# Patient Record
Sex: FEMALE | Employment: UNEMPLOYED | URBAN - METROPOLITAN AREA
[De-identification: names, ages, dates, MRNs, and addresses within clinical notes are randomized per-mention and may not be internally consistent; named-entity substitution may affect disease eponyms.]

---

## 2020-12-05 ENCOUNTER — HOSPITAL ENCOUNTER (EMERGENCY)
Age: 25
Discharge: HOME | End: 2020-12-05
Attending: EMERGENCY MEDICINE
Payer: MEDICARE

## 2020-12-05 VITALS
BODY MASS INDEX: 37.76 KG/M2 | RESPIRATION RATE: 18 BRPM | TEMPERATURE: 98.8 F | SYSTOLIC BLOOD PRESSURE: 144 MMHG | OXYGEN SATURATION: 97 % | HEART RATE: 110 BPM | WEIGHT: 220 LBS | DIASTOLIC BLOOD PRESSURE: 91 MMHG

## 2020-12-05 PROCEDURE — 6370000000 HC RX 637 (ALT 250 FOR IP): Performed by: STUDENT IN AN ORGANIZED HEALTH CARE EDUCATION/TRAINING PROGRAM

## 2020-12-05 PROCEDURE — 99283 EMERGENCY DEPT VISIT LOW MDM: CPT

## 2020-12-05 RX ORDER — DICYCLOMINE HYDROCHLORIDE 10 MG/1
20 CAPSULE ORAL ONCE
Status: COMPLETED | OUTPATIENT
Start: 2020-12-05 | End: 2020-12-05

## 2020-12-05 RX ORDER — SERTRALINE HYDROCHLORIDE 100 MG/1
150 TABLET, FILM COATED ORAL DAILY
Status: ON HOLD | COMMUNITY
End: 2022-09-04

## 2020-12-05 RX ORDER — DICYCLOMINE HYDROCHLORIDE 10 MG/ML
20 INJECTION INTRAMUSCULAR ONCE
Status: DISCONTINUED | OUTPATIENT
Start: 2020-12-05 | End: 2020-12-05

## 2020-12-05 RX ORDER — ONDANSETRON 4 MG/1
4 TABLET, FILM COATED ORAL ONCE
Status: COMPLETED | OUTPATIENT
Start: 2020-12-05 | End: 2020-12-05

## 2020-12-05 RX ORDER — BUPRENORPHINE AND NALOXONE 8; 2 MG/1; MG/1
3 FILM, SOLUBLE BUCCAL; SUBLINGUAL DAILY
Status: ON HOLD | COMMUNITY
End: 2022-09-04

## 2020-12-05 RX ORDER — PROMETHAZINE HYDROCHLORIDE 25 MG/ML
6.25 INJECTION, SOLUTION INTRAMUSCULAR; INTRAVENOUS ONCE
Status: DISCONTINUED | OUTPATIENT
Start: 2020-12-05 | End: 2020-12-05

## 2020-12-05 RX ADMIN — DICYCLOMINE HYDROCHLORIDE 20 MG: 10 CAPSULE ORAL at 10:48

## 2020-12-05 RX ADMIN — ONDANSETRON HYDROCHLORIDE 4 MG: 4 TABLET, FILM COATED ORAL at 10:48

## 2020-12-05 ASSESSMENT — ENCOUNTER SYMPTOMS
ABDOMINAL PAIN: 1
VOMITING: 0
SHORTNESS OF BREATH: 0
DIARRHEA: 0
CHEST TIGHTNESS: 1
EYE DISCHARGE: 1
SORE THROAT: 0
RHINORRHEA: 1
COLOR CHANGE: 0
NAUSEA: 1

## 2020-12-05 NOTE — ED PROVIDER NOTES
[facility] [address] [facility]     Emergency Department     Faculty Attestation        I performed a history and physical examination of the patient and discussed management with the resident. I reviewed the residents note and agree with the documented findings and plan of care. Any areas of disagreement are noted on the chart. I was personally present for the key portions of any procedures. I have documented in the chart those procedures where I was not present during the key portions. I have reviewed the emergency nurses triage note. I agree with the chief complaint, past medical history, past surgical history, allergies, medications, social and family history as documented unless otherwise noted below. For mid-level providers such as nurse practitioners as well as physicians assistants:    I have personally seen and evaluated the patient. I find the patient's history and physical exam are consistent with NP/PA documentation. I agree with the care provided, treatment rendered, disposition, & follow-up plan. Additional findings are as noted. Vital Signs: BP (!) 144/91   Pulse 110   Temp 98.8 °F (37.1 °C)   Resp 18   Wt 220 lb (99.8 kg)   SpO2 97%   BMI 37.76 kg/m²   PCP:  Precious Cisneros MD    Pertinent Comments:     Patient complains of withdrawal symptoms from Suboxone she was brought to get a Suboxone injection on 2 December but due to insurance policies this was pushed back to week. Planes of body aches and general malaise. Exam she is afebrile nontoxic she is texting in no acute distress during my entire examination. Although her triage vitals list her pulse is 110 when I taken myself is consistently in the to mid 80s.       Critical Care  None          Jefferson Rubin MD  Attending Emergency Medicine Physician              Pura Coyne MD  12/05/20 6986

## 2020-12-05 NOTE — ED PROVIDER NOTES
[facility] ED  Emergency Department Encounter  EmergencyMedicine Resident     Pt Pauline Vargas  MRN: [id number]  [place] 1995  Date of evaluation: 12/5/20  PCP:  No primary care provider on file. CHIEF COMPLAINT       Chief Complaint   Patient presents with    Other     Pt to to ED room 10 with c/o suboxone withdrawls. Pt states her last dose was 6 days ago. Pt states she has been taking half dose. Pt states she is out of her meds. HISTORY OF PRESENT ILLNESS  (Location/Symptom, Timing/Onset, Context/Setting, Quality, Duration, Modifying Factors, Severity.)      Kerensree Small is a 31 y.o. female who presents with concerns of Suboxone withdrawal.  She describes tremors, nausea, abdominal pain, chest pain shortness of breath, watery eyes, and aches all over. Patient last took a full dose of Suboxone 6 days ago and took half doses until 3 days ago. These symptoms have been progressing for the last few days. She came to the ED today hoping to get a dose of Suboxone until she can follow-up on Wednesday when she is scheduled. Patient denies recent illness, headache, vision changes, new weakness or numbness, or other systemic complaints. PAST MEDICAL / SURGICAL / SOCIAL / FAMILY HISTORY      has a past medical history of Endometriosis, GERD (gastroesophageal reflux disease), and History of miscarriage. has no past surgical history on file.       Social History     Socioeconomic History    Marital status: Single     Spouse name: Not on file    Number of children: Not on file    Years of education: 15    Highest education level: Not on file   Occupational History    Occupation: unemployed   Social Needs    Financial resource strain: Not on file    Food insecurity     Worry: Not on file     Inability: Not on file   [employer] needs     Medical: Not on file     Non-medical: Not on file   Tobacco Use    Smoking status: Current Every Day Smoker     Types: Cigarettes   Substance and Sexual Activity    Alcohol use: Yes     Alcohol/week: 2.5 standard drinks     Types: 3 drink(s) per week    Drug use: Yes     Types: Other-see comments     Comment: 10norco/day, fentanyl patches, marijuana, ecstacy    Sexual activity: Yes     Partners: Male   Lifestyle    Physical activity     Days per week: Not on file     Minutes per session: Not on file    Stress: Not on file   Relationships    Social connections     Talks on phone: Not on file     Gets together: Not on file     Attends Mandaeism service: Not on file     Active member of club or organization: Not on file     Attends meetings of clubs or organizations: Not on file     Relationship status: Not on file    Intimate partner violence     Fear of current or ex partner: Not on file     Emotionally abused: Not on file     Physically abused: Not on file     Forced sexual activity: Not on file   Other Topics Concern    Not on file   Social History Narrative    Not on file       No family history on file. Allergies:  Bee venom; Depakote er [divalproex sodium er]; Geodon [ziprasidone hcl]; Versed [midazolam]; and Morphine and related    Home Medications:  Prior to Admission medications    Medication Sig Start Date End Date Taking? Authorizing Provider   Buprenorphine HCl-Naloxone HCl (SUBOXONE SL) Place under the tongue   Yes Historical Provider, MD   sertraline (ZOLOFT) 100 MG tablet Take 150 mg by mouth daily   Yes Historical Provider, MD       REVIEW OF SYSTEMS    (2-9 systems for level 4, 10 or more for level 5)      Review of Systems   Constitutional: Negative for chills, diaphoresis and fever. HENT: Positive for rhinorrhea. Negative for sore throat. Eyes: Positive for discharge (watery). Negative for visual disturbance. Respiratory: Positive for chest tightness. Negative for shortness of breath. Cardiovascular: Negative for chest pain. Gastrointestinal: Positive for abdominal pain and nausea.  Negative for diarrhea and vomiting. Genitourinary: Negative for dysuria. Musculoskeletal: Positive for myalgias. Negative for arthralgias. Skin: Negative for color change and rash. Neurological: Positive for tremors and headaches. Negative for dizziness, light-headedness and numbness. PHYSICAL EXAM   (up to 7 for level 4, 8 or more for level 5)      INITIAL VITALS:   BP (!) 144/91   Pulse 110   Temp 98.8 °F (37.1 °C)   Resp 18   Wt 220 lb (99.8 kg)   SpO2 97%   BMI 37.76 kg/m²     Physical Exam  Constitutional:       General: She is not in acute distress. Appearance: Normal appearance. She is not ill-appearing, toxic-appearing or diaphoretic. HENT:      Head: Normocephalic and atraumatic. Nose: Nose normal.      Mouth/Throat:      Mouth: Mucous membranes are moist.      Pharynx: Oropharynx is clear. Eyes:      Extraocular Movements: Extraocular movements intact. Pupils: Pupils are equal, round, and reactive to light. Neck:      Musculoskeletal: Normal range of motion and neck supple. Cardiovascular:      Rate and Rhythm: Regular rhythm. Tachycardia present. Pulses: Normal pulses. Heart sounds: Normal heart sounds. No murmur. Pulmonary:      Effort: Pulmonary effort is normal. No respiratory distress. Breath sounds: No wheezing or rhonchi. Abdominal:      General: There is no distension. Palpations: Abdomen is soft. Tenderness: There is abdominal tenderness (generalized). Musculoskeletal: Normal range of motion. General: No swelling or signs of injury. Skin:     General: Skin is warm and dry. Neurological:      General: No focal deficit present. Mental Status: She is alert and oriented to person, place, and time. Motor: No weakness. Comments: Mild bilateral hand tremors when outstretched.    Psychiatric:         Mood and Affect: Mood normal.         Behavior: Behavior normal.         DIFFERENTIAL  DIAGNOSIS     PLAN (LABS / IMAGING / EKG):  No orders of the defined types were placed in this encounter. MEDICATIONS ORDERED:  Orders Placed This Encounter   Medications    DISCONTD: promethazine (PHENERGAN) injection 6.25 mg    DISCONTD: dicyclomine (BENTYL) injection 20 mg    ondansetron (ZOFRAN) tablet 4 mg    dicyclomine (BENTYL) capsule 20 mg       DIAGNOSTIC RESULTS / EMERGENCY DEPARTMENT COURSE / [facility]   LAB RESULTS:  No results found for this visit on 12/05/20. IMPRESSION: 24-year-old female presenting with concerns of Suboxone withdrawal, symptoms of nausea, tremors, watery eyes. Physical exam, patient is tachycardic at 115 otherwise vitals are stable. Heart is regular  rhythm and lungs are clear to auscultation. Patient is sitting calmly with mild tremors when hands outstretched. There is mild generalized tenderness to palpation of the abdomen, no rebound or guarding. This facility does not prescribe Suboxone and patient was informed of this. We will treat symptoms with Phenergan and Bentyl at this time. RADIOLOGY:  Not indicated    EKG  n/a    All EKG's are interpreted by the Emergency Department Physician who either signs or Co-signs this chart in the absence of a cardiologist.    EMERGENCY DEPARTMENT COURSE:  ED Course as of Dec 06 1503   Sat Dec 05, 2020   1039 Patient evaluated for concerns of Suboxone withdrawals. Experiencing nausea, muscle aches, abdominal pain, watery eyes. Last full dose was 6 days ago, last Dose 3 days ago. Will treat symptoms and plan to discharge with antiemetics for symptom management. [JS]   1113 Reevaluated the patient. She is still uncomfortable and describes abdominal pain. We discussed not being able to prescribe Suboxone here, patient would like to be discharged so she can go to another hospital that can.     [JS]      ED Course User Index  [JS] Alanis Sarah 1841, DO         PROCEDURES:  n/a    CONSULTS:  None    CRITICAL CARE:  Please see attending note    FINAL IMPRESSION      1. Generalized abdominal pain    2.  Nausea          DISPOSITION / PLAN     DISPOSITION Decision To Discharge 12/05/2020 11:11:54 AM      PATIENT REFERRED TO:  Shannon Kwan MD  [address] [facility] [address]  [phone]          [facility]  [address] [place] [address]  [phone]    If symptoms worsen      DISCHARGE MEDICATIONS:  Discharge Medication List as of 12/5/2020 11:42 AM          Leyda Medina DO  Emergency Medicine Resident    (Please note that portions of thisnote were completed with a voice recognition program.  Efforts were made to edit the dictations but occasionally words are mis-transcribed.)       Leyda Medina DO  Resident  12/06/20 0191

## 2022-02-28 ENCOUNTER — HOSPITAL ENCOUNTER (EMERGENCY)
Age: 27
Discharge: HOME | End: 2022-02-28
Attending: EMERGENCY MEDICINE
Payer: MEDICARE

## 2022-02-28 VITALS
DIASTOLIC BLOOD PRESSURE: 108 MMHG | TEMPERATURE: 98 F | SYSTOLIC BLOOD PRESSURE: 133 MMHG | OXYGEN SATURATION: 98 % | RESPIRATION RATE: 18 BRPM | HEART RATE: 99 BPM

## 2022-02-28 DIAGNOSIS — F11.93 OPIOID USE WITH WITHDRAWAL (HCC): Primary | ICD-10-CM

## 2022-02-28 LAB
ANION GAP SERPL CALCULATED.3IONS-SCNC: 14 MMOL/L (ref 9–17)
BUN BLDV-MCNC: 7 MG/DL (ref 6–20)
CALCIUM SERPL-MCNC: 9.5 MG/DL (ref 8.6–10.4)
CHLORIDE BLD-SCNC: 101 MMOL/L (ref 98–107)
CO2: 24 MMOL/L (ref 20–31)
CREAT SERPL-MCNC: 0.6 MG/DL (ref 0.5–0.9)
GFR AFRICAN AMERICAN: >60 ML/MIN
GFR NON-AFRICAN AMERICAN: >60 ML/MIN
GFR SERPL CREATININE-BSD FRML MDRD: NORMAL ML/MIN/{1.73_M2}
GLUCOSE BLD-MCNC: 88 MG/DL (ref 70–99)
HCG QUALITATIVE: NEGATIVE
POTASSIUM SERPL-SCNC: 3.7 MMOL/L (ref 3.7–5.3)
SODIUM BLD-SCNC: 139 MMOL/L (ref 135–144)

## 2022-02-28 PROCEDURE — 80048 BASIC METABOLIC PNL TOTAL CA: CPT

## 2022-02-28 PROCEDURE — 96374 THER/PROPH/DIAG INJ IV PUSH: CPT

## 2022-02-28 PROCEDURE — 84703 CHORIONIC GONADOTROPIN ASSAY: CPT

## 2022-02-28 PROCEDURE — 99285 EMERGENCY DEPT VISIT HI MDM: CPT

## 2022-02-28 PROCEDURE — 93005 ELECTROCARDIOGRAM TRACING: CPT | Performed by: GENERAL PRACTICE

## 2022-02-28 PROCEDURE — 96361 HYDRATE IV INFUSION ADD-ON: CPT

## 2022-02-28 PROCEDURE — 2580000003 HC RX 258: Performed by: GENERAL PRACTICE

## 2022-02-28 PROCEDURE — 96375 TX/PRO/DX INJ NEW DRUG ADDON: CPT

## 2022-02-28 PROCEDURE — 6360000002 HC RX W HCPCS: Performed by: GENERAL PRACTICE

## 2022-02-28 PROCEDURE — 6370000000 HC RX 637 (ALT 250 FOR IP): Performed by: GENERAL PRACTICE

## 2022-02-28 RX ORDER — ONDANSETRON 4 MG/1
4 TABLET, ORALLY DISINTEGRATING ORAL EVERY 8 HOURS PRN
Qty: 4 TABLET | Refills: 0 | Status: ON HOLD | OUTPATIENT
Start: 2022-02-28 | End: 2022-09-04

## 2022-02-28 RX ORDER — CLONIDINE 0.1 MG/24H
1 PATCH, EXTENDED RELEASE TRANSDERMAL WEEKLY
Status: DISCONTINUED | OUTPATIENT
Start: 2022-02-28 | End: 2022-02-28 | Stop reason: HOSPADM

## 2022-02-28 RX ORDER — DIPHENHYDRAMINE HYDROCHLORIDE 50 MG/ML
25 INJECTION INTRAMUSCULAR; INTRAVENOUS EVERY 6 HOURS PRN
Status: DISCONTINUED | OUTPATIENT
Start: 2022-02-28 | End: 2022-02-28 | Stop reason: HOSPADM

## 2022-02-28 RX ORDER — ONDANSETRON 4 MG/1
4 TABLET, ORALLY DISINTEGRATING ORAL ONCE
Status: COMPLETED | OUTPATIENT
Start: 2022-02-28 | End: 2022-02-28

## 2022-02-28 RX ORDER — KETOROLAC TROMETHAMINE 30 MG/ML
30 INJECTION, SOLUTION INTRAMUSCULAR; INTRAVENOUS ONCE
Status: COMPLETED | OUTPATIENT
Start: 2022-02-28 | End: 2022-02-28

## 2022-02-28 RX ORDER — 0.9 % SODIUM CHLORIDE 0.9 %
500 INTRAVENOUS SOLUTION INTRAVENOUS ONCE
Status: COMPLETED | OUTPATIENT
Start: 2022-02-28 | End: 2022-02-28

## 2022-02-28 RX ORDER — ONDANSETRON 2 MG/ML
4 INJECTION INTRAMUSCULAR; INTRAVENOUS ONCE
Status: COMPLETED | OUTPATIENT
Start: 2022-02-28 | End: 2022-02-28

## 2022-02-28 RX ADMIN — KETOROLAC TROMETHAMINE 30 MG: 30 INJECTION, SOLUTION INTRAMUSCULAR; INTRAVENOUS at 20:33

## 2022-02-28 RX ADMIN — ONDANSETRON 4 MG: 4 TABLET, ORALLY DISINTEGRATING ORAL at 20:38

## 2022-02-28 RX ADMIN — ONDANSETRON 4 MG: 2 INJECTION INTRAMUSCULAR; INTRAVENOUS at 19:47

## 2022-02-28 RX ADMIN — DIPHENHYDRAMINE HYDROCHLORIDE 25 MG: 50 INJECTION, SOLUTION INTRAMUSCULAR; INTRAVENOUS at 20:33

## 2022-02-28 RX ADMIN — SODIUM CHLORIDE 500 ML: 9 INJECTION, SOLUTION INTRAVENOUS at 19:47

## 2022-02-28 ASSESSMENT — ENCOUNTER SYMPTOMS
SHORTNESS OF BREATH: 0
BACK PAIN: 0
COUGH: 0
ABDOMINAL PAIN: 1
NAUSEA: 1
VOMITING: 1
DIARRHEA: 1

## 2022-02-28 ASSESSMENT — PAIN DESCRIPTION - PAIN TYPE: TYPE: ACUTE PAIN

## 2022-02-28 ASSESSMENT — PAIN - FUNCTIONAL ASSESSMENT: PAIN_FUNCTIONAL_ASSESSMENT: 0-10

## 2022-02-28 ASSESSMENT — PAIN SCALES - GENERAL: PAINLEVEL_OUTOF10: 7

## 2022-02-28 ASSESSMENT — PAIN DESCRIPTION - LOCATION: LOCATION: CHEST;HEAD

## 2022-03-01 LAB
EKG ATRIAL RATE: 107 BPM
EKG P AXIS: 49 DEGREES
EKG P-R INTERVAL: 172 MS
EKG Q-T INTERVAL: 358 MS
EKG QRS DURATION: 94 MS
EKG QTC CALCULATION (BAZETT): 477 MS
EKG R AXIS: 59 DEGREES
EKG T AXIS: 14 DEGREES
EKG VENTRICULAR RATE: 107 BPM

## 2022-03-01 NOTE — ED NOTES
The following labs labeled with pt sticker and tubed to lab:     [] Blue     [x] Lavender   [] on ice  [x] Green/yellow  [] Green/black [] on ice  [] Yellow  [] Red  [] Pink      [] COVID-19 swab    [] Rapid  [] PCR  [] Flu swab  [] Peds Viral Panel     [] Urine Sample  [] Pelvic Cultures  [] Blood Cultures            Jose Daniel Lanza RN  02/28/22 1948

## 2022-03-01 NOTE — ED PROVIDER NOTES
[facility]     Emergency Department     Faculty Attestation    I performed a history and physical examination of the patient and discussed management with the resident. I reviewed the residents note and agree with the documented findings including all diagnostic interpretations and plan of care. Any areas of disagreement are noted on the chart. I was personally present for the key portions of any procedures. I have documented in the chart those procedures where I was not present during the key portions. I have reviewed the emergency nurses triage note. I agree with the chief complaint, past medical history, past surgical history, allergies, medications, social and family history as documented unless otherwise noted below. Documentation of the HPI, Physical Exam and Medical Decision Making performed by scribmara is based on my personal performance of the HPI, PE and MDM. For Physician Assistant/ Nurse Practitioner cases/documentation I have personally evaluated this patient and have completed at least one if not all key elements of the E/M (history, physical exam, and MDM). Additional findings are as noted. This patient was evaluated in the Emergency Department for symptoms described in the history of present illness. He/she was evaluated in the context of the global COVID-19 pandemic, which necessitated consideration that the patient might be at risk for infection with the SARS-CoV-2 virus that causes COVID-19. Institutional protocols and algorithms that pertain to the evaluation of patients at risk for COVID-19 are in a state of rapid change based on information released by regulatory bodies including the CDC and federal and state organizations. These policies and algorithms were followed during the patient's care in the ED. Primary Care Physician: No primary care provider on file. History:  This is a 31 y.o. female who presents to the Emergency Department with complaint of chest pain, concerns for withdrawal from opioids. Patient has been intermittently on Suboxone. Reports she took last dose on Wednesday of last week. Associated nausea and diarrhea as well as body aches. No fevers. Physical:     oral temperature is 98 °F (36.7 °C). Her blood pressure is 133/108 (abnormal) and her pulse is 99. Her respiration is 18 and oxygen saturation is 98%.    31 y.o. female no acute distress, cardiac exam tachycardic regular, pulmonary clear bilaterally abdomen soft nontender nondistended, erector PLI activity noted and pupils are slightly dilated    Impression: Opiate withdrawal    Plan: Patient reports that she has an appointment tomorrow with Mamie Castellanos for intake on medication assisted therapies however attempts to confirm this appointment have been unsuccessful. As such I am reluctant to provide Suboxone in the emergency department as I cannot ensure appropriate transition of care.   Will treat withdrawal symptoms symptomatically    EKG Interpretation  EKG Interpretation    Interpreted by emergency department physician    Rhythm: sinus tachycardia  Rate: 100-110  Axis: normal  Ectopy: none  Conduction: normal  ST Segments: normal  T Waves: normal  Q Waves: none    EKG  Impression: sinus tachycardia    Clementine Shin MD      Interpreted by me    Heather Morales MD, Claudean Rhein  Attending Emergency Physician         Clementine Shin MD  02/28/22 9638

## 2022-03-01 NOTE — ED PROVIDER NOTES
[facility]  ED  Emergency Department Encounter  EmergencyMedicine Resident     Pt Dary Swan  MRN: [id number]  [place] 1995  Date of evaluation: 2/28/22  PCP:  No primary care provider on file. CHIEF COMPLAINT       Chief Complaint   Patient presents with    Chest Pain     substernal chest pain intermittent x 4 days    Withdrawal     withdrawingfrom suboxone- [id number] Wed hasnt had a dose since       HISTORY OF PRESENT ILLNESS  (Location/Symptom, Timing/Onset, Context/Setting, Quality, Duration, Modifying Factors, Severity.)      Simeon Dolan is a 31 y.o. female who presents with concern for Suboxone withdrawal, last him she took it was 1 week ago. Complaint of chest headache, chest pain nausea as well as vomiting, nonbloody, has been on Suboxone for 3 years.  Appointment via telehealth. Tried to follow-up today has an appointment for tomorrow but felt that she could not wait. Patient states that the chest pain radiates to the left breast, denies any associated shortness of breath, diaphoresis. Patient states that she did have a relapse this past weekend and took several pills of codeine, denies any SI. Patient is having diarrhea, chills. PAST MEDICAL / SURGICAL / SOCIAL / FAMILY HISTORY      has a past medical history of Endometriosis, GERD (gastroesophageal reflux disease), and History of miscarriage. has no past surgical history on file. Social History     Socioeconomic History    Marital status: Single     Spouse name: Not on file    Number of children: Not on file    Years of education: 15    Highest education level: Not on file   Occupational History    Occupation: unemployed   Tobacco Use    Smoking status: Former Smoker     Types: Cigarettes    Smokeless tobacco: Never Used   Substance and Sexual Activity    Alcohol use: Not Currently    Drug use: Yes     Types:  Other-see comments     Comment: 10norco/day, fentanyl patches, marijuana, ecstacy    Sexual activity: Yes     Partners: Male   Other Topics Concern    Not on file   Social History Narrative    Not on file     Social Determinants of Health     Financial Resource Strain:     Difficulty of Paying Living Expenses: Not on file   Food Insecurity:     Worried About Running Out of Food in the Last Year: Not on file    Ev of Food in the Last Year: Not on file   Transportation Needs:     Lack of Transportation (Medical): Not on file    Lack of Transportation (Non-Medical): Not on file   Physical Activity:     Days of Exercise per Week: Not on file    Minutes of Exercise per Session: Not on file   Stress:     Feeling of Stress : Not on file   Social Connections:     Frequency of Communication with Friends and Family: Not on file    Frequency of Social Gatherings with Friends and Family: Not on file    Attends [employer] Services: Not on file    Active Member of [address] [place] [address] [employer] or Organizations: Not on file    Attends Club or Organization Meetings: Not on file    Marital Status: Not on file   Intimate Partner Violence:     Fear of Current or Ex-Partner: Not on file    Emotionally Abused: Not on file    Physically Abused: Not on file    Sexually Abused: Not on file   Housing Stability:     Unable to Pay for Housing in the Last Year: Not on file    Number of [place] in the Last Year: Not on file    Unstable Housing in the Last Year: Not on file       History reviewed. No pertinent family history. Allergies:  Bee venom, Depakote er [divalproex sodium er], Geodon [ziprasidone hcl], Versed [midazolam], and Morphine and related    Home Medications:  Prior to Admission medications    Medication Sig Start Date End Date Taking?  Authorizing Provider   ondansetron (ZOFRAN ODT) 4 MG disintegrating tablet Take 1 tablet by mouth every 8 hours as needed for Nausea 2/28/22  Yes Yared Farias, DO   Buprenorphine HCl-Naloxone HCl (SUBOXONE SL) Place under the tongue    Historical Provider, MD   sertraline (ZOLOFT) 100 MG tablet Take 150 mg by mouth daily    Historical Provider, MD       REVIEW OF SYSTEMS    (2-9 systems for level 4, 10 or more for level 5)      Review of Systems   Constitutional: Positive for activity change, appetite change and chills. Negative for fever. Eyes: Negative for visual disturbance. Respiratory: Negative for cough and shortness of breath. Cardiovascular: Positive for chest pain. Gastrointestinal: Positive for abdominal pain, diarrhea, nausea and vomiting. Genitourinary: Negative for dysuria and menstrual problem. Musculoskeletal: Negative for back pain and gait problem. Skin: Negative for rash. Neurological: Positive for headaches. Psychiatric/Behavioral: Negative for agitation. PHYSICAL EXAM   (up to 7 for level 4, 8 or more for level 5)      INITIAL VITALS:   BP (!) 133/108   Pulse 99   Temp 98 °F (36.7 °C) (Oral)   Resp 18   LMP 01/28/2022   SpO2 98%     Physical Exam  Vitals reviewed. Constitutional:       Appearance: She is obese. Comments: Patient is sitting comfortably on stretcher, is nonacute distress, is not ill-appearing is not diaphoretic   HENT:      Head: Normocephalic and atraumatic. Eyes:      Extraocular Movements: Extraocular movements intact. Pupils: Pupils are equal, round, and reactive to light. Cardiovascular:      Rate and Rhythm: Normal rate. Pulmonary:      Comments: Breathing comfortable room air, symmetric chest rise, speaking full sentences, no evidence respiratory distress  Abdominal:      General: Abdomen is flat. There is no distension. Palpations: Abdomen is soft. Tenderness: There is no abdominal tenderness. There is no guarding or rebound. Musculoskeletal:      Right lower leg: No edema. Left lower leg: No edema. Neurological:      General: No focal deficit present. Mental Status: She is alert and oriented to person, place, and time.    Psychiatric: Mood and Affect: Mood normal.         Behavior: Behavior normal.         Thought Content:  Thought content normal.         Judgment: Judgment normal.         DIFFERENTIAL  DIAGNOSIS     PLAN (LABS / [facility] / EKG):  Orders Placed This Encounter   Procedures    Basic Metabolic Panel    HCG Qualitative, Serum    EKG 12 Lead       MEDICATIONS ORDERED:  Orders Placed This Encounter   Medications    ondansetron (ZOFRAN) injection 4 mg    0.9 % sodium chloride bolus    cloNIDine (CATAPRES) 0.1 MG/24HR 1 patch    ondansetron (ZOFRAN ODT) 4 MG disintegrating tablet     Sig: Take 1 tablet by mouth every 8 hours as needed for Nausea     Dispense:  4 tablet     Refill:  0    ketorolac (TORADOL) injection 30 mg    diphenhydrAMINE (BENADRYL) injection 25 mg    ondansetron (ZOFRAN-ODT) disintegrating tablet 4 mg       DDX: Opioid withdrawal, low suspicion for ACS/pneumonia, electrolyte abnormality    DIAGNOSTIC RESULTS / EMERGENCY DEPARTMENT COURSE / MDM   :  Results for orders placed or performed during the hospital encounter of 32/30/66   Basic Metabolic Panel   Result Value Ref Range    Glucose 88 70 - 99 mg/dL    BUN 7 6 - 20 mg/dL    CREATININE 0.60 0.50 - 0.90 mg/dL    Calcium 9.5 8.6 - 10.4 mg/dL    Sodium 139 135 - 144 mmol/L    Potassium 3.7 3.7 - 5.3 mmol/L    Chloride 101 98 - 107 mmol/L    CO2 24 20 - 31 mmol/L    Anion Gap 14 9 - 17 mmol/L    GFR Non-African American >60 >60 mL/min    GFR African American >60 >60 mL/min    GFR Comment         HCG Qualitative, Serum   Result Value Ref Range    hCG Qual NEGATIVE NEGATIVE           RADIOLOGY:  None    EKG  EKG Interpretation    Interpreted by me    Rhythm: normal sinus   Rate: normal  Axis: normal  Ectopy: none  Conduction: normal  ST Segments: no acute change  T Waves: no acute change  Q Waves: none    Clinical Impression: no acute changes and normal EKG    All EKG's are interpreted by the Emergency Department Physician who either signs or Co-signs this chart in the absence of a cardiologist.    EMERGENCY DEPARTMENT COURSE/IMPRESSION: 21-year-old female history of opioid abuse presented emerge department concern for withdrawal, has not had Suboxone in 1 week. Requesting Suboxone. Patient states that she does have an appointment tomorrow for Suboxone. Discussed with our clinical pharmacist unable to confirm the patient does have an appointment, at this time not feel that it is appropriate to dose Suboxone here in the emergency department. Patient was given clonidine patch, Toradol, Zofran, Zofran, Benadryl for headache. Was given 500 cc bolus. Educated patient to follow-up with her Suboxone clinic, primary care provider, educated patient on strict ED return precautions. PROCEDURES:  None    CONSULTS:  None    CRITICAL CARE:  None    FINAL IMPRESSION      1.  Opioid use with withdrawal [facility])          DISPOSITION / PLAN     DISPOSITION Decision To Discharge 02/28/2022 08:29:22 PM      PATIENT REFERRED TO:  [address] [place] [address]  [phone]  In 1 day      [facility]  [address] [place] [address]  [phone]    As needed, If symptoms worsen      DISCHARGE MEDICATIONS:  New Prescriptions    ONDANSETRON (ZOFRAN ODT) 4 MG DISINTEGRATING TABLET    Take 1 tablet by mouth every 8 hours as needed for Nausea       Rimma Contreras DO  Emergency Medicine Resident    (Please note that portions of thisnote were completed with a voice recognition program.  Efforts were made to edit the dictations but occasionally words are mis-transcribed.)     Rimma Contreras DO  Resident  02/28/22 2037

## 2022-06-28 ENCOUNTER — HOSPITAL ENCOUNTER (EMERGENCY)
Age: 27
Discharge: LEFT BEFORE DISCHARGE | End: 2022-06-28
Payer: MEDICARE

## 2022-06-28 VITALS
RESPIRATION RATE: 16 BRPM | HEART RATE: 97 BPM | SYSTOLIC BLOOD PRESSURE: 140 MMHG | TEMPERATURE: 98.1 F | OXYGEN SATURATION: 97 % | DIASTOLIC BLOOD PRESSURE: 89 MMHG

## 2022-06-28 PROCEDURE — 93005 ELECTROCARDIOGRAM TRACING: CPT | Performed by: STUDENT IN AN ORGANIZED HEALTH CARE EDUCATION/TRAINING PROGRAM

## 2022-06-28 ASSESSMENT — PAIN SCALES - GENERAL: PAINLEVEL_OUTOF10: 6

## 2022-06-28 ASSESSMENT — PAIN - FUNCTIONAL ASSESSMENT: PAIN_FUNCTIONAL_ASSESSMENT: 0-10

## 2022-06-29 LAB
EKG ATRIAL RATE: 93 BPM
EKG P AXIS: 53 DEGREES
EKG P-R INTERVAL: 180 MS
EKG Q-T INTERVAL: 380 MS
EKG QRS DURATION: 96 MS
EKG QTC CALCULATION (BAZETT): 472 MS
EKG R AXIS: 55 DEGREES
EKG T AXIS: 26 DEGREES
EKG VENTRICULAR RATE: 93 BPM

## 2022-06-29 PROCEDURE — 93010 ELECTROCARDIOGRAM REPORT: CPT | Performed by: INTERNAL MEDICINE

## 2022-08-11 ENCOUNTER — TELEPHONE (OUTPATIENT)
Dept: BARIATRICS/WEIGHT MGMT | Age: 27
End: 2022-08-11

## 2022-09-03 RX ORDER — IBUPROFEN 400 MG/1
400 TABLET ORAL EVERY 6 HOURS PRN
Status: CANCELLED | OUTPATIENT
Start: 2022-09-03

## 2022-09-04 ENCOUNTER — HOSPITAL ENCOUNTER (INPATIENT)
Age: 27
LOS: 3 days | Discharge: HOME | DRG: 751 | End: 2022-09-07
Attending: PSYCHIATRY & NEUROLOGY | Admitting: PSYCHIATRY & NEUROLOGY
Payer: MEDICARE

## 2022-09-04 PROBLEM — R45.851 DEPRESSION WITH SUICIDAL IDEATION: Status: ACTIVE | Noted: 2022-09-04

## 2022-09-04 PROBLEM — F32.A DEPRESSION WITH SUICIDAL IDEATION: Status: ACTIVE | Noted: 2022-09-04

## 2022-09-04 PROBLEM — E66.01 MORBID OBESITY (HCC): Status: ACTIVE | Noted: 2022-09-04

## 2022-09-04 PROCEDURE — 1240000000 HC EMOTIONAL WELLNESS R&B

## 2022-09-04 PROCEDURE — APPSS60 APP SPLIT SHARED TIME 46-60 MINUTES: Performed by: NURSE PRACTITIONER

## 2022-09-04 PROCEDURE — 90792 PSYCH DIAG EVAL W/MED SRVCS: CPT | Performed by: PSYCHIATRY & NEUROLOGY

## 2022-09-04 PROCEDURE — 99232 SBSQ HOSP IP/OBS MODERATE 35: CPT | Performed by: INTERNAL MEDICINE

## 2022-09-04 PROCEDURE — G0480 DRUG TEST DEF 1-7 CLASSES: HCPCS

## 2022-09-04 PROCEDURE — 6370000000 HC RX 637 (ALT 250 FOR IP): Performed by: PSYCHIATRY & NEUROLOGY

## 2022-09-04 RX ORDER — HALOPERIDOL 5 MG
5 TABLET ORAL EVERY 6 HOURS PRN
Status: DISCONTINUED | OUTPATIENT
Start: 2022-09-04 | End: 2022-09-07 | Stop reason: HOSPADM

## 2022-09-04 RX ORDER — TRAZODONE HYDROCHLORIDE 50 MG/1
50 TABLET ORAL NIGHTLY PRN
Status: DISCONTINUED | OUTPATIENT
Start: 2022-09-04 | End: 2022-09-07 | Stop reason: HOSPADM

## 2022-09-04 RX ORDER — BUPRENORPHINE HYDROCHLORIDE AND NALOXONE HYDROCHLORIDE DIHYDRATE 8; 2 MG/1; MG/1
3 TABLET SUBLINGUAL DAILY
Status: DISCONTINUED | OUTPATIENT
Start: 2022-09-04 | End: 2022-09-07 | Stop reason: HOSPADM

## 2022-09-04 RX ORDER — SERTRALINE HYDROCHLORIDE 25 MG/1
25 TABLET, FILM COATED ORAL DAILY
Status: DISCONTINUED | OUTPATIENT
Start: 2022-09-04 | End: 2022-09-05

## 2022-09-04 RX ORDER — BACILLUS COAGULANS/INULIN 1B-250 MG
1 CAPSULE ORAL DAILY
Status: ON HOLD | COMMUNITY
End: 2022-09-07 | Stop reason: SDUPTHER

## 2022-09-04 RX ORDER — BUPRENORPHINE HYDROCHLORIDE AND NALOXONE HYDROCHLORIDE DIHYDRATE 8; 2 MG/1; MG/1
3 TABLET SUBLINGUAL DAILY
Status: ON HOLD | COMMUNITY
End: 2022-09-07 | Stop reason: HOSPADM

## 2022-09-04 RX ORDER — HYDROXYZINE 50 MG/1
50 TABLET, FILM COATED ORAL 3 TIMES DAILY PRN
Status: DISCONTINUED | OUTPATIENT
Start: 2022-09-04 | End: 2022-09-07 | Stop reason: HOSPADM

## 2022-09-04 RX ORDER — POLYETHYLENE GLYCOL 3350 17 G/17G
17 POWDER, FOR SOLUTION ORAL DAILY PRN
Status: DISCONTINUED | OUTPATIENT
Start: 2022-09-04 | End: 2022-09-07 | Stop reason: HOSPADM

## 2022-09-04 RX ORDER — LACTOBACILLUS RHAMNOSUS GG 10B CELL
1 CAPSULE ORAL DAILY
Status: DISCONTINUED | OUTPATIENT
Start: 2022-09-04 | End: 2022-09-07 | Stop reason: HOSPADM

## 2022-09-04 RX ORDER — ACETAMINOPHEN 325 MG/1
650 TABLET ORAL EVERY 6 HOURS PRN
Status: DISCONTINUED | OUTPATIENT
Start: 2022-09-04 | End: 2022-09-07 | Stop reason: HOSPADM

## 2022-09-04 RX ORDER — HALOPERIDOL 5 MG/ML
5 INJECTION INTRAMUSCULAR EVERY 6 HOURS PRN
Status: DISCONTINUED | OUTPATIENT
Start: 2022-09-04 | End: 2022-09-07 | Stop reason: HOSPADM

## 2022-09-04 RX ORDER — MAGNESIUM HYDROXIDE/ALUMINUM HYDROXICE/SIMETHICONE 120; 1200; 1200 MG/30ML; MG/30ML; MG/30ML
30 SUSPENSION ORAL EVERY 6 HOURS PRN
Status: DISCONTINUED | OUTPATIENT
Start: 2022-09-04 | End: 2022-09-07 | Stop reason: HOSPADM

## 2022-09-04 RX ORDER — DIPHENHYDRAMINE HYDROCHLORIDE 50 MG/ML
50 INJECTION INTRAMUSCULAR; INTRAVENOUS EVERY 6 HOURS PRN
Status: DISCONTINUED | OUTPATIENT
Start: 2022-09-04 | End: 2022-09-07 | Stop reason: HOSPADM

## 2022-09-04 RX ADMIN — TRAZODONE HYDROCHLORIDE 50 MG: 50 TABLET ORAL at 21:48

## 2022-09-04 RX ADMIN — Medication 1 CAPSULE: at 21:48

## 2022-09-04 RX ADMIN — HYDROXYZINE HYDROCHLORIDE 50 MG: 50 TABLET, FILM COATED ORAL at 21:48

## 2022-09-04 RX ADMIN — HYDROXYZINE HYDROCHLORIDE 50 MG: 50 TABLET, FILM COATED ORAL at 01:05

## 2022-09-04 RX ADMIN — TRAZODONE HYDROCHLORIDE 50 MG: 50 TABLET ORAL at 01:05

## 2022-09-04 RX ADMIN — SERTRALINE HYDROCHLORIDE 25 MG: 25 TABLET ORAL at 12:41

## 2022-09-04 ASSESSMENT — LIFESTYLE VARIABLES
HOW MANY STANDARD DRINKS CONTAINING ALCOHOL DO YOU HAVE ON A TYPICAL DAY: PATIENT DOES NOT DRINK
HOW OFTEN DO YOU HAVE A DRINK CONTAINING ALCOHOL: NEVER
HOW MANY STANDARD DRINKS CONTAINING ALCOHOL DO YOU HAVE ON A TYPICAL DAY: PATIENT DOES NOT DRINK
HOW OFTEN DO YOU HAVE A DRINK CONTAINING ALCOHOL: NEVER

## 2022-09-04 ASSESSMENT — SLEEP AND FATIGUE QUESTIONNAIRES
AVERAGE NUMBER OF SLEEP HOURS: 6
DO YOU USE A SLEEP AID: NO
DO YOU HAVE DIFFICULTY SLEEPING: NO

## 2022-09-04 ASSESSMENT — PATIENT HEALTH QUESTIONNAIRE - PHQ9: SUM OF ALL RESPONSES TO PHQ QUESTIONS 1-9: 16

## 2022-09-04 NOTE — BH NOTE
[facility]  Initial Interdisciplinary Treatment Plan NO      Original treatment plan Date & Time: 9/4/22 13:00    Admission Type:  Admission Type: Voluntary    Reason for admission:   Reason for Admission: Patient is having increase in depression with suicidal ideation with no specific plan    Estimated Length of Stay:  5-7days  Estimated Discharge Date: to be determined by physician    PATIENT STRENGTHS:  Patient Strengths:   Patient Strengths and Limitations:Limitations: Multiple barriers to leisure interests, Inappropriate/potentially harmful leisure interests, Difficulty problem solving/relies on others to help solve problems, External locus of control  Addictive Behavior: Addictive Behavior  In the Past 3 Months, Have You Felt or Has Someone Told You That You Have a Problem With  : None  Medical Problems:  Past Medical History:   Diagnosis Date    Endometriosis     GERD (gastroesophageal reflux disease)     History of miscarriage      Status EXAM:Mental Status and Behavioral Exam  Normal: No  Level of Assistance: Independent/Self  Facial Expression: Flat  Affect: Appropriate  Level of Consciousness: Alert  Frequency of Checks: 4 times per hour, close  Mood:Normal: No  Mood: Depressed, Anxious  Motor Activity:Normal: No  Motor Activity: Decreased  Eye Contact: Good  Observed Behavior: Cooperative  Sexual Misconduct History: Current - no  Preception: [place] to person, [place] to time, [place] to place, [place] to situation  Attention:Normal: No  Attention: Distractible  Thought Processes: Circumstantial  Thought Content:Normal: No  Thought Content: Preoccupations  Depression Symptoms: Feelings of hopelessess, Feelings of worthlessness  Anxiety Symptoms: Generalized  Sharlene Symptoms: No problems reported or observed.   Hallucinations: None  Delusions: No  Memory:Normal: No  Memory: Poor recent  Insight and Judgment: No  Insight and Judgment: Poor judgment, Poor insight    EDUCATION:   Learner Progress Toward Treatment Goals: reviewed group plans and strategies for care    Method:group therapy, medication compliance, individualized assessments and care planning    Outcome: needs reinforcement    PATIENT GOALS: to be discussed with patient within 72 hours    PLAN/TREATMENT RECOMMENDATIONS:     continue group therapy , medications compliance, goal setting, individualized assessments and care, continue to monitor pt on unit      SHORT-TERM GOALS:   Time frame for Short-Term Goals: 5-7 days  Find medications to take and continue to take. Find housing to remain stable for discharge.      LONG-TERM GOALS:  Time frame for Long-Term Goals: 6 months  Maintain taking medications and continue to go to follow up appointments   Members Present in Team Meeting: See Anneliese Galeana, RN

## 2022-09-04 NOTE — GROUP NOTE
Group Therapy Note    Date: 9/4/2022    Group Start Time: 1030  Group End Time: 1100  Group Topic: Psychoeducation    [facility]        Group Therapy Note    Attendees 9/22      Patient refused to attend psychotherapy group after encouragement from staff. 1:1 talk time offered but refused. Signature:   Mustapha Verde

## 2022-09-04 NOTE — H&P
[facility]  Jared Waldron MD; Orestes Mon MD; Heather Whipple MD; MD Tito Fountain MD; Debi Valentin MD    [facility]   [address] / HISTORY AND PHYSICAL EXAMINATION            Date:   9/4/2022  Patient name:  Jake Falk  Date of admission:  9/4/2022 12:28 AM  MRN:   [id number]  Account:  [de-identified]  YOB: 1995  PCP:    SAMI Vega CNP  Room:   Formerly named [facility]33-01  Code Status:    Full Code    Physician Requesting Consult: Josh Oliveros MD    Reason for Consult: Medical management    Chief Complaint:     No chief complaint on file. Suicidal ideations    History Obtained From:     patient    History of Present Illness:       25-year-old female with underlying history of anxiety and depression, GERD, morbid obesity, current smoker, admitted to inpatient psych with suicidal ideations    Past Medical History:     Past Medical History:   Diagnosis Date    Endometriosis     GERD (gastroesophageal reflux disease)     History of miscarriage         Past Surgical History:     No past surgical history on file. Medications Prior to Admission:     Prior to Admission medications    Medication Sig Start Date End Date Taking? Authorizing Provider   buprenorphine-naloxone (SUBOXONE) 8-2 MG SUBL SL tablet Place 3 tablets under the tongue daily. Yes Historical Provider, MD   Bacillus Coagulans-Inulin (PROBIOTIC) 1-250 BILLION-MG CAPS Take 1 capsule by mouth daily   Yes Historical Provider, MD        Allergies:     Bee venom, Depakote er [divalproex sodium er], Geodon [ziprasidone hcl], Prednisone, Toradol [ketorolac tromethamine], Versed [midazolam], and Morphine and related    Social History:     Tobacco:    reports that she has quit smoking. Her smoking use included cigarettes.  She has never used smokeless tobacco.  Alcohol:      reports that she does not currently use alcohol. Drug Use:  reports current drug use. Drug: Other-see comments. Family History:     No family history on file. Review of Systems:     Positive and Negative as described in HPI. CONSTITUTIONAL:  negative for fevers, chills, sweats, fatigue, weight loss  HEENT:  negative for vision, hearing changes, runny nose, throat pain  RESPIRATORY:  negative for shortness of breath, cough, congestion, wheezing. CARDIOVASCULAR:  negative for chest pain, palpitations. GASTROINTESTINAL:  negative for nausea, vomiting, diarrhea, constipation, change in bowel habits, abdominal pain   GENITOURINARY:  negative for difficulty of urination, burning with urination, frequency   INTEGUMENT:  negative for rash, skin lesions, easy bruising   HEMATOLOGIC/LYMPHATIC:  negative for swelling/edema   ALLERGIC/IMMUNOLOGIC:  negative for urticaria , itching  ENDOCRINE:  negative increase in drinking, increase in urination, hot or cold intolerance  MUSCULOSKELETAL:  negative joint pains, muscle aches, swelling of joints  NEUROLOGICAL:  negative for headaches, dizziness, lightheadedness, numbness, pain, tingling extremities    Physical Exam:     BP (!) 108/49   Pulse 69   Temp 97.8 °F (36.6 °C) (Oral)   Resp 14   Ht 5' 4\" (1.626 m)   Wt 220 lb (99.8 kg)   BMI 37.76 kg/m²   Temp (24hrs), Av.9 °F (36.6 °C), Min:97.8 °F (36.6 °C), Max:98 °F (36.7 °C)    No results for input(s): POCGLU in the last 72 hours. No intake or output data in the 24 hours ending 22 1618    General Appearance:  alert, well appearing, and in no acute distress  Mental status: oriented to person, place, and time with normal affect  Head:  normocephalic, atraumatic.   Eye: no icterus, redness, pupils equal and reactive, extraocular eye movements intact, conjunctiva clear  Ear: normal external ear, no discharge, hearing intact  Nose:  no drainage noted  Mouth: mucous membranes moist  Neck: supple, no carotid bruits, thyroid not palpable  Lungs: Bilateral equal air entry, clear to ausculation, no wheezing, rales or rhonchi, normal effort  Cardiovascular: normal rate, regular rhythm, no murmur, gallop, rub. Abdomen: Soft, nontender, nondistended, normal bowel sounds, no hepatomegaly or splenomegaly  Neurologic: There are no new focal motor or sensory deficits, normal muscle tone and bulk, no abnormal sensation, normal speech, cranial nerves II through XII grossly intact  Skin: No gross lesions, rashes, bruising or bleeding on exposed skin area  Extremities:  peripheral pulses palpable, no pedal edema or calf pain with palpation  Psych: normal affect    Investigations:      Laboratory Testing:  No results found for this or any previous visit (from the past 24 hour(s)). Imaging/Diagonstics:  No results found. Assessment :      Hospital Problems             Last Modified POA    * (Principal) Major depressive disorder, recurrent severe without psychotic features ([facility] [address] 9/4/2022 Yes    Morbid obesity ([facility] [address] 9/4/2022 Yes       Plan:     26-year-old female with underlying history of anxiety and depression admitted to inpatient psych for suicidal ideations,  opioid abuse, on Suboxone at this time,  Morbid obesity, low-calorie diet, lifestyle modification advised,  Current smoker advised to quit smoking,  DVT prophylaxis, patient mobile,  Full CODE STATUS,  Labs now, CBC CMP and TSH    Consultations:   IP CONSULT TO INTERNAL MEDICINE      Velta Bernheim, MD  9/4/2022  4:18 PM    Copy sent to Dr. Elsa Vanessa, APRN - CNP    Please note that this chart was generated using voice recognition Dragon dictation software. Although every effort was made to ensure the accuracy of this automated transcription, some errors in transcription may have occurred.

## 2022-09-04 NOTE — PLAN OF CARE
Problem: Self Harm/Suicidality  Goal: Will have no self-injury during hospital stay  Description: INTERVENTIONS:  1. Q 30 MINUTES: Routine safety checks  2. Q SHIFT & PRN: Assess risk to determine if routine checks are adequate to maintain patient safety  Outcome: Progressing     Problem: Depression  Goal: Will be euthymic at discharge  Description: INTERVENTIONS:  1. Administer medication as ordered  2. Provide emotional support via 1:1 interaction with staff  3. Encourage involvement in milieu/groups/activities  4. Monitor for social isolation  Outcome: Progressing   Pt currently denies any self harming thoughts denies any hallucinations isolative aloof out for needs mostly.
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
## 2022-09-04 NOTE — PROGRESS NOTES
Behavioral Services  Medicare Certification Upon Admission    I certify that this patient's inpatient psychiatric hospital admission is medically necessary for:    [x] (1) Treatment which could reasonably be expected to improve this patient's condition,       [x] (2) Or for diagnostic study;     AND     [x](2) The inpatient psychiatric services are provided while the individual is under the care of a physician and are included in the individualized plan of care.     Estimated length of stay/service 4 to 7 days    Plan for post-hospital care Home with outpatient community mental health follow-up    Electronically signed by Darlene Waters MD on 9/4/2022 at 3:27 PM
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
## 2022-09-04 NOTE — BH NOTE
[facility]  Admission Note     Admission Type:   Admission Type: Voluntary    Reason for admission:  Reason for Admission: Patient is having increase in depression with suicidal ideation with no specific plan      Addictive Behavior:   Addictive Behavior  In the Past 3 Months, Have You Felt or Has Someone Told You That You Have a Problem With  : None    Medical Problems:   Past Medical History:   Diagnosis Date    Endometriosis     GERD (gastroesophageal reflux disease)     History of miscarriage        Status EXAM:  Mental Status and Behavioral Exam  Normal: No  Level of Assistance: Independent/Self  Facial Expression: Flat  Affect: Blunt  Level of Consciousness: Alert  Frequency of Checks: 4 times per hour, close  Mood:Normal: No  Mood: Depressed, Anxious  Motor Activity:Normal: Yes  Eye Contact: Fair  Observed Behavior: Cooperative  Sexual Misconduct History: Current - no  Preception: [place] to person, [place] to time, Moss to place, [place] to situation  Attention:Normal: Yes  Thought Processes: Other (comment)  Thought Content:Normal: Yes  Depression Symptoms: Feelings of helplessness, Loss of interest, Feelings of worthlessness, Feelings of hopelessess  Anxiety Symptoms: Generalized, Panic attack  Sharlene Symptoms: No problems reported or observed.   Hallucinations: None  Delusions: No  Memory:Normal: Yes  Insight and Judgment: No  Insight and Judgment: Poor insight    Tobacco Screening:  Practical Counseling, on admission, simin X, if applicable and completed (first 3 are required if patient doesn't refuse):            ( ) Recognizing danger situations (included triggers and roadblocks)                    ( ) Coping skills (new ways to manage stress,relaxation techniques, changing routine, distraction)                                                           ( ) Basic information about quitting (benefits of quitting, techniques in how to quit, available resources  ( ) Referral for counseling faxed to [facility]                                                                                                                   ( ) Patient refused counseling  ( X) Patient has not smoked in the last 30 days    Metabolic Screening:    No results found for: LABA1C    No results found for: CHOL  No results found for: TRIG  No results found for: HDL  No components found for: LDLCAL  No results found for: LABVLDL      Body mass index is 37.76 kg/m². BP Readings from Last 2 Encounters:   09/04/22 (!) 128/95   02/28/22 (!) 133/108           Pt admitted with followings belongings:  Dental Appliances: None  Vision - Corrective Lenses: None  Hearing Aid: None  Jewelry: Body Piercing  Body Piercings Removed: No  Clothing:  Footwear, Jacket/Coat, Pants, Shirt, Socks, Undergarments  Other Valuables: Money, Wallet, Personal Toiletries ($14.80)    Hilda Hidalgo RN

## 2022-09-04 NOTE — BH NOTE
[facility]  Admission Note     Admission Type:   Admission Type: Voluntary    Reason for admission:  Reason for Admission: Patient is having increase in depression with suicidal ideation with no specific plan  Patient states she has hx of attempts years ago. Pt states she has daily panic attacks. She lives with her grandmother. She has a hx of polysubstance abuse and is currently getting suboxone from [facility]. Patient was wanded for safety and changed into hospital attire. Patient was cooperative with admission process and signed all admission paperwork. Addictive Behavior:   Addictive Behavior  In the Past 3 Months, Have You Felt or Has Someone Told You That You Have a Problem With  : None    Medical Problems:   Past Medical History:   Diagnosis Date    Endometriosis     GERD (gastroesophageal reflux disease)     History of miscarriage        Status EXAM:  Mental Status and Behavioral Exam  Normal: No  Level of Assistance: Independent/Self  Facial Expression: Flat  Affect: Blunt  Level of Consciousness: Alert  Frequency of Checks: 4 times per hour, close  Mood:Normal: No  Mood: Depressed, Anxious  Motor Activity:Normal: Yes  Eye Contact: Fair  Observed Behavior: Cooperative  Sexual Misconduct History: Current - no  Preception: [place] to person, [place] to time, [place] to place, [place] to situation  Attention:Normal: Yes  Thought Processes: Other (comment)  Thought Content:Normal: Yes  Depression Symptoms: Feelings of helplessness, Loss of interest, Feelings of worthlessness, Feelings of hopelessess  Anxiety Symptoms: Generalized, Panic attack  Sharlene Symptoms: No problems reported or observed.   Hallucinations: None  Delusions: No  Memory:Normal: Yes  Insight and Judgment: No  Insight and Judgment: Poor insight    Tobacco Screening:  Practical Counseling, on admission, simin X, if applicable and completed (first 3 are required if patient doesn't refuse):            ( ) Recognizing danger situations (included triggers and roadblocks)                    ( ) Coping skills (new ways to manage stress,relaxation techniques, changing routine, distraction)                                                           ( ) Basic information about quitting (benefits of quitting, techniques in how to quit, available resources  ( ) Referral for counseling faxed to [facility]                                                                                                                   ( ) Patient refused counseling  ( x) Patient has not smoked in the last 30 days    Metabolic Screening:    No results found for: LABA1C    No results found for: CHOL  No results found for: TRIG  No results found for: HDL  No components found for: LDLCAL  No results found for: LABVLDL      Body mass index is 37.76 kg/m². BP Readings from Last 2 Encounters:   09/04/22 (!) 128/95   02/28/22 (!) 133/108           Pt admitted with followings belongings:  Dental Appliances: None  Vision - Corrective Lenses: None  Hearing Aid: None  Jewelry: Body Piercing  Body Piercings Removed: No  Clothing:  Footwear, Jacket/Coat, Pants, Shirt, Socks, Undergarments  Other Valuables: Money, Wallet, Personal Toiletries ($14.80)    Jesika Etienne RN

## 2022-09-05 LAB
ABSOLUTE EOS #: 0.41 K/UL (ref 0–0.4)
ABSOLUTE LYMPH #: 2.37 K/UL (ref 1–4.8)
ABSOLUTE MONO #: 0.82 K/UL (ref 0.1–1.3)
ALBUMIN SERPL-MCNC: 4 G/DL (ref 3.5–5.2)
ALP BLD-CCNC: 63 U/L (ref 35–104)
ALT SERPL-CCNC: 19 U/L (ref 5–33)
ANION GAP SERPL CALCULATED.3IONS-SCNC: 10 MMOL/L (ref 9–17)
AST SERPL-CCNC: 28 U/L
BASOPHILS # BLD: 1 % (ref 0–2)
BASOPHILS ABSOLUTE: 0.1 K/UL (ref 0–0.2)
BILIRUB SERPL-MCNC: 0.3 MG/DL (ref 0.3–1.2)
BUN BLDV-MCNC: 11 MG/DL (ref 6–20)
CALCIUM SERPL-MCNC: 9.3 MG/DL (ref 8.6–10.4)
CHLORIDE BLD-SCNC: 100 MMOL/L (ref 98–107)
CO2: 27 MMOL/L (ref 20–31)
CREAT SERPL-MCNC: 0.68 MG/DL (ref 0.5–0.9)
EOSINOPHILS RELATIVE PERCENT: 4 % (ref 0–4)
GFR AFRICAN AMERICAN: >60 ML/MIN
GFR NON-AFRICAN AMERICAN: >60 ML/MIN
GFR SERPL CREATININE-BSD FRML MDRD: NORMAL ML/MIN/{1.73_M2}
GLUCOSE BLD-MCNC: 95 MG/DL (ref 70–99)
HCT VFR BLD CALC: 35.3 % (ref 36–46)
HEMOGLOBIN: 11.4 G/DL (ref 12–16)
LYMPHOCYTES # BLD: 23 % (ref 24–44)
MCH RBC QN AUTO: 24 PG (ref 26–34)
MCHC RBC AUTO-ENTMCNC: 32.4 G/DL (ref 31–37)
MCV RBC AUTO: 74.1 FL (ref 80–100)
MONOCYTES # BLD: 8 % (ref 1–7)
MORPHOLOGY: ABNORMAL
PDW BLD-RTO: 15.6 % (ref 11.5–14.9)
PLATELET # BLD: 275 K/UL (ref 150–450)
PMV BLD AUTO: 8.6 FL (ref 6–12)
POTASSIUM SERPL-SCNC: 4.3 MMOL/L (ref 3.7–5.3)
RBC # BLD: 4.76 M/UL (ref 4–5.2)
SEG NEUTROPHILS: 64 % (ref 36–66)
SEGMENTED NEUTROPHILS ABSOLUTE COUNT: 6.6 K/UL (ref 1.3–9.1)
SODIUM BLD-SCNC: 137 MMOL/L (ref 135–144)
TOTAL PROTEIN: 6.9 G/DL (ref 6.4–8.3)
TSH SERPL DL<=0.05 MIU/L-ACNC: 1.17 UIU/ML (ref 0.3–5)
WBC # BLD: 10.3 K/UL (ref 3.5–11)

## 2022-09-05 PROCEDURE — 1240000000 HC EMOTIONAL WELLNESS R&B

## 2022-09-05 PROCEDURE — 80053 COMPREHEN METABOLIC PANEL: CPT

## 2022-09-05 PROCEDURE — 36415 COLL VENOUS BLD VENIPUNCTURE: CPT

## 2022-09-05 PROCEDURE — APPSS30 APP SPLIT SHARED TIME 16-30 MINUTES: Performed by: NURSE PRACTITIONER

## 2022-09-05 PROCEDURE — 85025 COMPLETE CBC W/AUTO DIFF WBC: CPT

## 2022-09-05 PROCEDURE — 84443 ASSAY THYROID STIM HORMONE: CPT

## 2022-09-05 PROCEDURE — 6370000000 HC RX 637 (ALT 250 FOR IP): Performed by: PSYCHIATRY & NEUROLOGY

## 2022-09-05 PROCEDURE — 99232 SBSQ HOSP IP/OBS MODERATE 35: CPT | Performed by: PSYCHIATRY & NEUROLOGY

## 2022-09-05 PROCEDURE — 6370000000 HC RX 637 (ALT 250 FOR IP): Performed by: NURSE PRACTITIONER

## 2022-09-05 RX ORDER — BUSPIRONE HYDROCHLORIDE 10 MG/1
10 TABLET ORAL 3 TIMES DAILY
Status: DISCONTINUED | OUTPATIENT
Start: 2022-09-05 | End: 2022-09-07 | Stop reason: HOSPADM

## 2022-09-05 RX ORDER — ONDANSETRON 4 MG/1
4 TABLET, ORALLY DISINTEGRATING ORAL EVERY 8 HOURS PRN
Status: DISCONTINUED | OUTPATIENT
Start: 2022-09-05 | End: 2022-09-07 | Stop reason: HOSPADM

## 2022-09-05 RX ORDER — CYCLOBENZAPRINE HCL 10 MG
10 TABLET ORAL 3 TIMES DAILY PRN
Status: DISCONTINUED | OUTPATIENT
Start: 2022-09-05 | End: 2022-09-07 | Stop reason: HOSPADM

## 2022-09-05 RX ORDER — DICYCLOMINE HYDROCHLORIDE 10 MG/1
10 CAPSULE ORAL
Status: DISCONTINUED | OUTPATIENT
Start: 2022-09-05 | End: 2022-09-07 | Stop reason: HOSPADM

## 2022-09-05 RX ADMIN — ACETAMINOPHEN 650 MG: 325 TABLET, FILM COATED ORAL at 20:40

## 2022-09-05 RX ADMIN — TRAZODONE HYDROCHLORIDE 50 MG: 50 TABLET ORAL at 20:41

## 2022-09-05 RX ADMIN — Medication 1 CAPSULE: at 20:42

## 2022-09-05 RX ADMIN — HYDROXYZINE HYDROCHLORIDE 50 MG: 50 TABLET, FILM COATED ORAL at 20:41

## 2022-09-05 RX ADMIN — ONDANSETRON 4 MG: 4 TABLET, ORALLY DISINTEGRATING ORAL at 13:53

## 2022-09-05 RX ADMIN — CYCLOBENZAPRINE 10 MG: 10 TABLET, FILM COATED ORAL at 13:53

## 2022-09-05 RX ADMIN — ACETAMINOPHEN 650 MG: 325 TABLET, FILM COATED ORAL at 14:58

## 2022-09-05 RX ADMIN — DICYCLOMINE HYDROCHLORIDE 10 MG: 10 CAPSULE ORAL at 14:58

## 2022-09-05 RX ADMIN — CYCLOBENZAPRINE 10 MG: 10 TABLET, FILM COATED ORAL at 20:41

## 2022-09-05 RX ADMIN — ACETAMINOPHEN 650 MG: 325 TABLET, FILM COATED ORAL at 07:56

## 2022-09-05 RX ADMIN — SERTRALINE HYDROCHLORIDE 25 MG: 25 TABLET ORAL at 07:56

## 2022-09-05 ASSESSMENT — PAIN DESCRIPTION - LOCATION
LOCATION: ABDOMEN
LOCATION: HEAD
LOCATION: HEAD

## 2022-09-05 ASSESSMENT — PAIN SCALES - GENERAL
PAINLEVEL_OUTOF10: 0
PAINLEVEL_OUTOF10: 3
PAINLEVEL_OUTOF10: 2
PAINLEVEL_OUTOF10: 7
PAINLEVEL_OUTOF10: 3
PAINLEVEL_OUTOF10: 0

## 2022-09-05 ASSESSMENT — LIFESTYLE VARIABLES
HOW MANY STANDARD DRINKS CONTAINING ALCOHOL DO YOU HAVE ON A TYPICAL DAY: PATIENT DOES NOT DRINK
HOW OFTEN DO YOU HAVE A DRINK CONTAINING ALCOHOL: NEVER

## 2022-09-05 ASSESSMENT — PAIN SCALES - WONG BAKER: WONGBAKER_NUMERICALRESPONSE: 0

## 2022-09-05 NOTE — GROUP NOTE
Group Therapy Note    Date: 9/5/2022    Group Start Time: 1000  Group End Time: 0206  Group Topic: Psychoeducation    [address],[address], [place], [place]        Group Therapy Note    Attendees: 8/22       Patient refused to attend psychoeducation group at 10:00 am after encouragement from staff. 1:1 talk time provided as alternative to group session.       Discipline Responsible: /Counselor      Signature:  AGUSTIN Heath, [place]

## 2022-09-05 NOTE — CARE COORDINATION
Pt came to SW office stating \"I was told to come to you to be discharged, I need to go today. \" SW explained that is a conversation that pt would need to have with the doctor. Pt expressed frustration due to having children that attend school and pt needing to be there to put them on the bus. SW attempted to offer support, however pt walked away.

## 2022-09-05 NOTE — GROUP NOTE
Group Therapy Note    Date: 9/5/2022    Group Start Time: 1400  Group End Time: 1500  Group Topic: Psychoeducation    [facility]    Balbina Bragg, IRENES    Group Therapy Note    Attendees: 12       Patient's Goal:  Patient will demonstrate improved interpersonal skills. Notes:  Patient attended group and participated. Status After Intervention:  Improved    Participation Level:  Active Listener and Interactive    Participation Quality: Appropriate, Attentive, Sharing, and Supportive      Speech:  normal      Thought Process/Content: Logical  Linear      Affective Functioning: Constricted/Restricted      Mood: anxious      Level of consciousness:  Alert, Oriented x4, and Attentive      Response to Learning: Able to verbalize current knowledge/experience, Able to verbalize/acknowledge new learning, Able to retain information, Capable of insight, Able to change behavior, and Progressing to goal      Endings: None Reported    Modes of Intervention: Education, Support, Socialization, and Exploration      Discipline Responsible: Psychoeducational Specialist      Signature:  Remberto Mayen, [address]

## 2022-09-05 NOTE — PLAN OF CARE
Patient has been seclusive in bed in her room this evening. Patient was compliant with scheduled medications this evening. Patient states she has been eating and sleeping okay. Patient denies any suicidal thoughts at this time. Patient agrees to come talk with staff if having any thoughts to harm himself this shift. 15 min rounds continued for patient safety. Problem: Self Harm/Suicidality  Goal: Will have no self-injury during hospital stay  Description: INTERVENTIONS:  1. Q 30 MINUTES: Routine safety checks  2. Q SHIFT & PRN: Assess risk to determine if routine checks are adequate to maintain patient safety  Outcome: Progressing     Problem: Depression  Goal: Will be euthymic at discharge  Description: INTERVENTIONS:  1. Administer medication as ordered  2. Provide emotional support via 1:1 interaction with staff  3. Encourage involvement in milieu/groups/activities  4.  Monitor for social isolation  Outcome: Progressing

## 2022-09-05 NOTE — PROGRESS NOTES
Daily Progress Note  9/5/2022    Patient Name: Katt Robles    CHIEF COMPLAINT: Suicidal ideation         SUBJECTIVE:     Patient seen face-to-face for follow-up assessment. She is watching a movie in the day area and is agreeable to speak privately with writer. Patient's thought process is linear and goal-directed. Able to engage in open-ended conversation. Patient is tearful and expresses feelings of emptiness and extreme anxiety. States that she does not want to feel like this anymore and is feeling hopeless and helpless. Patient was started on sertraline 25 mg. We reviewed side effect since she denies all. In the past she thought that sertraline was beneficial but does worry that she will need an adjunctive medication. We explored these thoughts more. She states that she is tired of feeling on edge and wants a medication that we will \"make me feel like a zombie\". Provided emotional support and expressed importance of being able to experience emotion and therapy would be beneficial to be able to cope with trauma and anxiety more appropriately. Patient is agreeable. Patient is agreeable to continue titration of her sertraline to help with her mood. She also notes that she would be interested in trialing buspirone to see if that could help with her increased anxiety today. Did encourage patient to utilize hydroxyzine as needed. Patient is discharged focused. Stating that she needs to get back home to take care of her daughter. Yesterday, patient had voiced that her mother was watching her daughter and today she states that it is her grandmother who is watching her daughter. Getting conflicting information. Patient complaining of opiate withdrawal secondary to her bupropion not being restarted after her urine toxicology was positive for opiates. Waiting for buprenorphine screen to return. Patient's vitals are within appropriate range and minimal pupillary dilation observed.   Will order as needed medications for any withdrawal symptoms including Bentyl, Zofran and Flexeril. Patient has yet to demonstrate stability. She is expressing significant depression and anxiety with suicidal thoughts and unable to contract for safety off unit. Requires inpatient hospitalization for safety. Psych med compliant: [x] Yes    [] No     E-meds in last 24 hrs: [] Yes   [x] No    Appetite:  [x] Normal/Adequate/Unchanged  [] Increased  [] Decreased      Sleep:       [] Normal/Adequate/Unchanged  [x] Fair  [] Poor      Group Attendance:   [] Yes  [] Selectively    [x] No    Medication Side Effects: Denies         Mental Status Exam  Level of consciousness: Alert and awake   Appearance: Appropriate attire for setting, seated in chair, with fair  grooming and hygiene   Behavior/Motor: Approachable, tearful  Attitude toward examiner: Cooperative, attentive, good eye contact   Speech: spontaneous, normal rate, normal volume, and well articulated   Mood: Patient reports \"terrible\"  Affect: depressed  Thought processes: linear and goal directed   Thought content: Denies homicidal ideation. Discharge focused. Suicidal Ideation: Fleeting suicidal ideation, unable to contract for safety off unit  Delusions: Denies  Perceptual Disturbance: Denies. Does not appear to be responding to internal stimuli. Cognition: Oriented to self, location, time, and situation  Memory: intact  Insight & Judgement: fair     Data   height is 5' 4\" (1.626 m) and weight is 220 lb (99.8 kg). Her oral temperature is 97.1 °F (36.2 °C). Her blood pressure is 107/64 and her pulse is 68. Her respiration is 14.    Labs:   Admission on 09/04/2022   Component Date Value Ref Range Status    WBC 09/05/2022 10.3  3.5 - 11.0 k/uL Final    RBC 09/05/2022 4.76  4.0 - 5.2 m/uL Final    Hemoglobin 09/05/2022 11.4 (A) 12.0 - 16.0 g/dL Final    Hematocrit 09/05/2022 35.3 (A) 36 - 46 % Final    MCV 09/05/2022 74.1 (A) 80 - 100 fL Final    MCH 09/05/2022 24.0 (A) 26 - 34 pg Final    MCHC 09/05/2022 32.4  31 - 37 g/dL Final    RDW 09/05/2022 15.6 (A) 11.5 - 14.9 % Final    Platelets 06/66/2581 275  150 - 450 k/uL Final    MPV 09/05/2022 8.6  6.0 - 12.0 fL Final    Seg Neutrophils 09/05/2022 64  36 - 66 % Final    Lymphocytes 09/05/2022 23 (A) 24 - 44 % Final    Monocytes 09/05/2022 8 (A) 1 - 7 % Final    Eosinophils % 09/05/2022 4  0 - 4 % Final    Basophils 09/05/2022 1  0 - 2 % Final    Segs Absolute 09/05/2022 6.60  1.3 - 9.1 k/uL Final    Absolute Lymph # 09/05/2022 2.37  1.0 - 4.8 k/uL Final    Absolute Mono # 09/05/2022 0.82  0.1 - 1.3 k/uL Final    Absolute Eos # 09/05/2022 0.41 (A) 0.0 - 0.4 k/uL Final    Basophils Absolute 09/05/2022 0.10  0.0 - 0.2 k/uL Final    Morphology 09/05/2022 ANISOCYTOSIS PRESENT   Final    Morphology 09/05/2022 MICROCYTOSIS PRESENT   Final    Morphology 09/05/2022 HYPOCHROMIA PRESENT   Final    Glucose 09/05/2022 95  70 - 99 mg/dL Final    BUN 09/05/2022 11  6 - 20 mg/dL Final    Creatinine 09/05/2022 0.68  0.50 - 0.90 mg/dL Final    Calcium 09/05/2022 9.3  8.6 - 10.4 mg/dL Final    Sodium 09/05/2022 137  135 - 144 mmol/L Final    Potassium 09/05/2022 4.3  3.7 - 5.3 mmol/L Final    Chloride 09/05/2022 100  98 - 107 mmol/L Final    CO2 09/05/2022 27  20 - 31 mmol/L Final    Anion Gap 09/05/2022 10  9 - 17 mmol/L Final    Alkaline Phosphatase 09/05/2022 63  35 - 104 U/L Final    ALT 09/05/2022 19  5 - 33 U/L Final    AST 09/05/2022 28  <32 U/L Final    Total Bilirubin 09/05/2022 0.3  0.3 - 1.2 mg/dL Final    Total Protein 09/05/2022 6.9  6.4 - 8.3 g/dL Final    Albumin 09/05/2022 4.0  3.5 - 5.2 g/dL Final    GFR Non- 09/05/2022 >60  >60 mL/min Final    GFR  09/05/2022 >60  >60 mL/min Final    GFR Comment 09/05/2022        Final    Comment: Average GFR for 2129 years old:   116 mL/min/1.73sq m  Chronic Kidney Disease:   <60 mL/min/1.73sq m  Kidney failure:   <15 mL/min/1.73sq m              eGFR calculated recognition software. It may contain minor errors which are inherent in voice recognition technology. **     I independently saw and evaluated the patient. I reviewed the nurse practitioners documentation above. Any additional comments or changes to the nurse practitioners documentation are stated below otherwise agree with assessment. Plan will be as follows:  Patient may be experiencing mild opiate withdrawal however for day 3 after stopping a reported 24 mg of buprenorphine her withdrawal is not anywhere near what 1 would expect if somebody were consuming her scheduled dose. Unfortunately there is concern that the patient has been diverting her buprenorphine to purchase other substances. We are still waiting for buprenorphine urine results, may be positive as patient has been filling on a regular basis and likely taking some to test positive for urine test but again not showing the physiological signs of withdrawal 1 would expect. She is still reporting depression, wants to increase her Zoloft and wants to add BuSpar for anxiety. PLAN  Patient s symptoms   show no change  Increase Zoloft to 50 mg daily  Add BuSpar 3 times daily  Attempt to develop insight  Psycho-education conducted. Supportive Therapy conducted.   Probable discharge is undetermined at this time  Follow-up daily while on inpatient unit

## 2022-09-05 NOTE — PLAN OF CARE
Problem: Self Harm/Suicidality  Goal: Will have no self-injury during hospital stay  Description: INTERVENTIONS:  1. Q 30 MINUTES: Routine safety checks  2. Q SHIFT & PRN: Assess risk to determine if routine checks are adequate to maintain patient safety  9/5/2022 1020 by Dakota Cunningham RN  Outcome: Progressing     Problem: Depression  Goal: Will be euthymic at discharge  Description: INTERVENTIONS:  1. Administer medication as ordered  2. Provide emotional support via 1:1 interaction with staff  3. Encourage involvement in milieu/groups/activities  4. Monitor for social isolation  9/5/2022 1020 by Dakota Cunningham RN  Outcome: Progressing     Problem: Pain  Goal: Verbalizes/displays adequate comfort level or baseline comfort level  Outcome: Progressing     Patient has no self injury. Patient remains free of injury. Patient denies pain at this time. Patient denies thoughts of self harm or thoughts to harm others. Patient denies hallucinations.

## 2022-09-06 VITALS
SYSTOLIC BLOOD PRESSURE: 126 MMHG | DIASTOLIC BLOOD PRESSURE: 84 MMHG | BODY MASS INDEX: 37.56 KG/M2 | TEMPERATURE: 98 F | WEIGHT: 220 LBS | HEIGHT: 64 IN | RESPIRATION RATE: 14 BRPM | HEART RATE: 87 BPM

## 2022-09-06 PROCEDURE — 6370000000 HC RX 637 (ALT 250 FOR IP): Performed by: INTERNAL MEDICINE

## 2022-09-06 PROCEDURE — 1240000000 HC EMOTIONAL WELLNESS R&B

## 2022-09-06 PROCEDURE — 99232 SBSQ HOSP IP/OBS MODERATE 35: CPT | Performed by: INTERNAL MEDICINE

## 2022-09-06 PROCEDURE — 90833 PSYTX W PT W E/M 30 MIN: CPT | Performed by: PSYCHIATRY & NEUROLOGY

## 2022-09-06 PROCEDURE — 6370000000 HC RX 637 (ALT 250 FOR IP): Performed by: PSYCHIATRY & NEUROLOGY

## 2022-09-06 PROCEDURE — 99232 SBSQ HOSP IP/OBS MODERATE 35: CPT | Performed by: PSYCHIATRY & NEUROLOGY

## 2022-09-06 PROCEDURE — 6370000000 HC RX 637 (ALT 250 FOR IP): Performed by: NURSE PRACTITIONER

## 2022-09-06 RX ORDER — FERROUS SULFATE 325(65) MG
325 TABLET ORAL 2 TIMES DAILY WITH MEALS
Status: DISCONTINUED | OUTPATIENT
Start: 2022-09-06 | End: 2022-09-07 | Stop reason: HOSPADM

## 2022-09-06 RX ADMIN — Medication 1 CAPSULE: at 21:51

## 2022-09-06 RX ADMIN — SERTRALINE HYDROCHLORIDE 50 MG: 50 TABLET ORAL at 09:01

## 2022-09-06 RX ADMIN — ACETAMINOPHEN 650 MG: 325 TABLET, FILM COATED ORAL at 09:07

## 2022-09-06 RX ADMIN — CYCLOBENZAPRINE 10 MG: 10 TABLET, FILM COATED ORAL at 21:07

## 2022-09-06 RX ADMIN — BUSPIRONE HYDROCHLORIDE 10 MG: 10 TABLET ORAL at 21:06

## 2022-09-06 RX ADMIN — TRAZODONE HYDROCHLORIDE 50 MG: 50 TABLET ORAL at 21:06

## 2022-09-06 RX ADMIN — DICYCLOMINE HYDROCHLORIDE 10 MG: 10 CAPSULE ORAL at 12:38

## 2022-09-06 RX ADMIN — DICYCLOMINE HYDROCHLORIDE 10 MG: 10 CAPSULE ORAL at 09:01

## 2022-09-06 RX ADMIN — ACETAMINOPHEN 650 MG: 325 TABLET, FILM COATED ORAL at 18:52

## 2022-09-06 RX ADMIN — BUSPIRONE HYDROCHLORIDE 10 MG: 10 TABLET ORAL at 13:57

## 2022-09-06 RX ADMIN — BUSPIRONE HYDROCHLORIDE 10 MG: 10 TABLET ORAL at 09:01

## 2022-09-06 RX ADMIN — HYDROXYZINE HYDROCHLORIDE 50 MG: 50 TABLET, FILM COATED ORAL at 18:52

## 2022-09-06 RX ADMIN — DICYCLOMINE HYDROCHLORIDE 10 MG: 10 CAPSULE ORAL at 15:59

## 2022-09-06 ASSESSMENT — PAIN SCALES - GENERAL
PAINLEVEL_OUTOF10: 3
PAINLEVEL_OUTOF10: 6
PAINLEVEL_OUTOF10: 3
PAINLEVEL_OUTOF10: 0
PAINLEVEL_OUTOF10: 1
PAINLEVEL_OUTOF10: 3

## 2022-09-06 NOTE — PLAN OF CARE
[address] [place] [address]  Day 3 Interdisciplinary Treatment Plan NOTE    Review Date & Time: 9/6/22 1300    Admission Type:   Admission Type: Voluntary    Reason for admission:  Reason for Admission: Patient is having increase in depression with suicidal ideation with no specific plan  Estimated Length of Stay: 5-7 days  Estimated Discharge Date Update: to be determined by physician    PATIENT STRENGTHS:  Patient Strengths:   Patient Strengths and Limitations:Limitations: Multiple barriers to leisure interests, Inappropriate/potentially harmful leisure interests, Difficulty problem solving/relies on others to help solve problems, External locus of control  Addictive Behavior:Addictive Behavior  In the Past 3 Months, Have You Felt or Has Someone Told You That You Have a Problem With  : None  Medical Problems:  Past Medical History:   Diagnosis Date    Endometriosis     GERD (gastroesophageal reflux disease)     History of miscarriage        Risk:  Fall Risk   Krzysztof Scale Krzysztof Scale Score: 22  BVC    Change in scores: No Changes to plan of Care: No    Status EXAM:   Mental Status and Behavioral Exam  Normal: No  Level of Assistance: Independent/Self  Facial Expression: Flat  Affect: Blunt  Level of Consciousness: Alert  Frequency of Checks: 4 times per hour, close  Mood:Normal: No  Mood: Depressed, Anxious  Motor Activity:Normal: No  Motor Activity: Decreased  Eye Contact: Fair  Observed Behavior: Cooperative  Sexual Misconduct History: Current - no  Preception: [place] to person, [place] to time, [place] to place, [place] to situation  Attention:Normal: No  Attention: Distractible  Thought Processes: Circumstantial  Thought Content:Normal: No  Thought Content: Poverty of content  Depression Symptoms: Impaired concentration  Anxiety Symptoms: Generalized  Sharlene Symptoms: No problems reported or observed.   Hallucinations: None  Delusions: No  Memory:Normal: No  Memory: Poor recent, Poor remote  Insight and Judgment: No  Insight and Judgment: Poor judgment, Poor insight    Daily Assessment Last Entry:   Daily Sleep (WDL): Within Defined Limits            Daily Nutrition (WDL): Within Defined Limits  Level of Assistance: Independent/Self    Patient Monitoring:  Frequency of Checks: 4 times per hour, close    Psychiatric Symptoms:   Depression Symptoms  Depression Symptoms: Impaired concentration  Anxiety Symptoms  Anxiety Symptoms: Generalized  Sharlene Symptoms  Sharlene Symptoms: No problems reported or observed. Suicide Risk CSSR-S:  1) Within the past month, have you wished you were dead or wished you could go to sleep and not wake up? : Yes  2) Have you actually had any thoughts of killing yourself? : Yes  3) Have you been thinking about how you might kill yourself? : Yes  5) Have you started to work out or worked out the details of how to kill yourself?  Do you intend to carry out this plan? : No  6) Have you ever done anything, started to do anything, or prepared to do anything to end your life?: No  Change in Result: no Change in Plan of care: no      EDUCATION:   Learner Progress Toward Treatment Goals: Reviewed results and recommendations of this team, Reviewed group plan and strategies, Reviewed signs, symptoms and risk of self harm and violent behavior, Reviewed goals and plan of care    Method: small group, individual verbal education    Outcome: Verbalized by patient but needs reinforcement to obtain goals    PATIENT GOALS:  Short term: medication stabilization  Long term: medication compliance and attend follow up appointments    PLAN/TREATMENT RECOMMENDATIONS UPDATE: continue with group therapies, increased socialization, continue planning for after discharge goals, continue with medication compliance    SHORT-TERM GOALS UPDATE:   Time frame for Short-Term Goals: 5-7 days    LONG-TERM GOALS UPDATE:   Time frame for Long-Term Goals: 6 months    Members Present in Team Meeting:   See signature sheet    Marlene Ashley Honor

## 2022-09-06 NOTE — GROUP NOTE
Group Therapy Note    Date: 9/6/2022    Group Start Time: 1000  Group End Time: 1050  Group Topic: Psychotherapy    [address], LSW        Group Therapy Note    Attendees: 5/21       patient refused to attend psychotherapy group at [address] [place] [address] after encouragement from staff.   1:1 talk time provided as alternative to group session

## 2022-09-06 NOTE — PROGRESS NOTES
[facility] Internal Medicine  Akanksha Aldridge MD; Omero Amaya MD; Velvet Cunningham MD; MD Nanette Diaz MD; MD HCEL [facility] Internal Medicine   [address] / HISTORY AND PHYSICAL EXAMINATION            Date:   9/6/2022  Patient name:  Reginaldo Jasmine  Date of admission:  9/4/2022 12:28 AM  MRN:   [id number]  Account:  [de-identified]  YOB: 1995  PCP:    SAMI Pal CNP  Room:   [address] [place] [address]  Code Status:    Full Code    Physician Requesting Consult: Yusuf White MD    Reason for Consult: Medical management    Chief Complaint:     No chief complaint on file. Suicidal ideations    History Obtained From:     patient    History of Present Illness:       58-year-old female with underlying history of anxiety and depression, GERD, morbid obesity, current smoker, admitted to inpatient psych with suicidal ideations    Past Medical History:     Past Medical History:   Diagnosis Date    Endometriosis     GERD (gastroesophageal reflux disease)     History of miscarriage         Past Surgical History:     No past surgical history on file. Medications Prior to Admission:     Prior to Admission medications    Medication Sig Start Date End Date Taking? Authorizing Provider   buprenorphine-naloxone (SUBOXONE) 8-2 MG SUBL SL tablet Place 3 tablets under the tongue daily. Yes Historical Provider, MD   Bacillus Coagulans-Inulin (PROBIOTIC) 1-250 BILLION-MG CAPS Take 1 capsule by mouth daily   Yes Historical Provider, MD        Allergies:     Bee venom, Depakote er [divalproex sodium er], Geodon [ziprasidone hcl], Prednisone, Toradol [ketorolac tromethamine], Versed [midazolam], and Morphine and related    Social History:     Tobacco:    reports that she has quit smoking. Her smoking use included cigarettes.  She has never used smokeless tobacco.  Alcohol:      reports that she does not currently use alcohol. Drug Use:  reports current drug use. Drug: Other-see comments. Family History:     No family history on file. Review of Systems:     Positive and Negative as described in HPI. CONSTITUTIONAL:  negative for fevers, chills, sweats, fatigue, weight loss  HEENT:  negative for vision, hearing changes, runny nose, throat pain  RESPIRATORY:  negative for shortness of breath, cough, congestion, wheezing. CARDIOVASCULAR:  negative for chest pain, palpitations. GASTROINTESTINAL:  negative for nausea, vomiting, diarrhea, constipation, change in bowel habits, abdominal pain   GENITOURINARY:  negative for difficulty of urination, burning with urination, frequency   INTEGUMENT:  negative for rash, skin lesions, easy bruising   HEMATOLOGIC/LYMPHATIC:  negative for swelling/edema   ALLERGIC/IMMUNOLOGIC:  negative for urticaria , itching  ENDOCRINE:  negative increase in drinking, increase in urination, hot or cold intolerance  MUSCULOSKELETAL:  negative joint pains, muscle aches, swelling of joints  NEUROLOGICAL:  negative for headaches, dizziness, lightheadedness, numbness, pain, tingling extremities    Physical Exam:     /66   Pulse 69   Temp 98.2 °F (36.8 °C) (Oral)   Resp 14   Ht 5' 4\" (1.626 m)   Wt 220 lb (99.8 kg)   BMI 37.76 kg/m²   Temp (24hrs), Av.2 °F (36.8 °C), Min:98.2 °F (36.8 °C), Max:98.2 °F (36.8 °C)    No results for input(s): POCGLU in the last 72 hours. No intake or output data in the 24 hours ending 22 1439    General Appearance:  alert, well appearing, and in no acute distress  Mental status: oriented to person, place, and time with normal affect  Head:  normocephalic, atraumatic.   Eye: no icterus, redness, pupils equal and reactive, extraocular eye movements intact, conjunctiva clear  Ear: normal external ear, no discharge, hearing intact  Nose:  no drainage noted  Mouth: mucous membranes moist  Neck: supple, no carotid bruits, thyroid not palpable  Lungs: Bilateral equal air entry, clear to ausculation, no wheezing, rales or rhonchi, normal effort  Cardiovascular: normal rate, regular rhythm, no murmur, gallop, rub. Abdomen: Soft, nontender, nondistended, normal bowel sounds, no hepatomegaly or splenomegaly  Neurologic: There are no new focal motor or sensory deficits, normal muscle tone and bulk, no abnormal sensation, normal speech, cranial nerves II through XII grossly intact  Skin: No gross lesions, rashes, bruising or bleeding on exposed skin area  Extremities:  peripheral pulses palpable, no pedal edema or calf pain with palpation  Psych: normal affect    Investigations:      Laboratory Testing:  No results found for this or any previous visit (from the past 24 hour(s)). Imaging/Diagonstics:  No results found. Assessment :      Hospital Problems             Last Modified POA    * (Principal) Major depressive disorder, recurrent severe without psychotic features ([facility] [address] 9/4/2022 Yes    Morbid obesity ([facility] [address] 9/4/2022 Yes       Plan:     50-year-old female with underlying history of anxiety and depression admitted to inpatient psych for suicidal ideations,  opioid abuse, on Suboxone at this time,  Morbid obesity, low-calorie diet, lifestyle modification advised,  Current smoker advised to quit smoking,  DVT prophylaxis, patient mobile,  Full CODE STATUS,  Labs now, CBC CMP and TSH    9/6  Labs reviewed-microcytic hypochromic anemia-likely iron deficiency patient started on iron supplements advised to follow-up    Consultations:   Link Mclain MD  9/6/2022  2:39 PM    Copy sent to Dr. Maureen King, APRN - CNP    Please note that this chart was generated using voice recognition Dragon dictation software. Although every effort was made to ensure the accuracy of this automated transcription, some errors in transcription may have occurred.

## 2022-09-06 NOTE — GROUP NOTE
Group Therapy Note    Date: 9/6/2022    Group Start Time: 1430  Group End Time: 0214  Group Topic: Cognitive Skills    [facility] SHAYY Arias, [facility]        Group Therapy Note    Attendees: 12/18     Patient's Goal:  To increase social interaction and to practice expressing feelings, and explore relaxation techniques and resources,stress management, and communication skills           Notes: Pt attended group and started to participate but was pulled from group to meet with medical provider and did not return to group after this meeting. Status After Intervention: Unchanged d/t unable to fully participate. Participation Level: Attentive, sharing briefly d/t pulled from group     Participation Quality:  Active listening, sharing briefly d/t pulled from group     Speech:  Normal     Thought Process/Content: Logical et linear     Affective Functioning: Congruent     Mood: Euthymic     Level of consciousness:  Alert, and Attentive        Response to Learning:  Able to verbalize current knowledge briefly while able to participate.  and Progressing to goal        Endings: None Reported     Modes of Intervention: Education, Support, Socialization, Exploration, Clarifying and Problem-solving        Discipline Responsible: Psychoeducational Specialist        Signature:  Joby Earl

## 2022-09-06 NOTE — BH NOTE
Pt has refused 0900 community meeting despite encouragement. 1:1 talk time and alternative activities offered.

## 2022-09-06 NOTE — GROUP NOTE
Wrap-Up Group Note         Date: September 5, 2022 Start Time: 8:30pm End Time: 8:55pm      Number of Participants in Group & Unit Census: 12/16   Topic: HS Goal Wrap-Up    Goal of Group: Identify and discuss daily goals with group attendees. Comments:    Patient did not participate in Wrap-Up group, despite staff encouragement and explanation of benefits. Patient remains seclusive to self. Q15 minute safety checks maintained for patient safety and will continue to encourage patient to attend unit programming.

## 2022-09-06 NOTE — PLAN OF CARE
Problem: Self Harm/Suicidality  Goal: Will have no self-injury during hospital stay  Description: INTERVENTIONS:  1. Q 30 MINUTES: Routine safety checks  2. Q SHIFT & PRN: Assess risk to determine if routine checks are adequate to maintain patient safety  9/6/2022 1454 by eHrminia Marrufo LPN  Outcome: Progressing     Problem: Pain  Goal: Verbalizes/displays adequate comfort level or baseline comfort level  9/6/2022 1454 by Herminia Marrufo LPN  Outcome: Progressing

## 2022-09-06 NOTE — PLAN OF CARE
Problem: Pain  Goal: Verbalizes/displays adequate comfort level or baseline comfort level  Outcome: Progressing     Problem: Self Harm/Suicidality  Goal: Will have no self-injury during hospital stay  Description: INTERVENTIONS:  1. Q 30 MINUTES: Routine safety checks  2. Q SHIFT & PRN: Assess risk to determine if routine checks are adequate to maintain patient safety  Outcome: Progressing    Pt denies suicidal ideations at this time. Pt agreed to seek staff at anytime she felt like any urges to harm self would arise. Safety checks maintained [id number]. Pt remains free from self harm this shift. Pt denies wanting to cause harm to self or others at this time. Pt encouraged to seek nursing staff at anytime if she felt at danger to themselves or others. Pt states understanding. Safety checks maintained [id number]    Patient is complaining of anxiety at this time. Stating that they feel restless and are having trouble sleeping and calming down in order to rest this evening. Medication was given as prescribed for increased anxiety see MAR. Patient aloof all shift. Reports that she is going through withdrawal  She was given withdrawal protocol.

## 2022-09-07 PROCEDURE — 99239 HOSP IP/OBS DSCHRG MGMT >30: CPT | Performed by: PSYCHIATRY & NEUROLOGY

## 2022-09-07 PROCEDURE — 6370000000 HC RX 637 (ALT 250 FOR IP): Performed by: PSYCHIATRY & NEUROLOGY

## 2022-09-07 RX ORDER — HYDROXYZINE 50 MG/1
50 TABLET, FILM COATED ORAL 3 TIMES DAILY PRN
Qty: 30 TABLET | Refills: 0 | Status: SHIPPED | OUTPATIENT
Start: 2022-09-07 | End: 2022-09-17

## 2022-09-07 RX ORDER — SERTRALINE HYDROCHLORIDE 100 MG/1
TABLET, FILM COATED ORAL
Qty: 30 TABLET | Refills: 0 | Status: SHIPPED | OUTPATIENT
Start: 2022-09-07

## 2022-09-07 RX ORDER — TRAZODONE HYDROCHLORIDE 50 MG/1
50 TABLET ORAL NIGHTLY PRN
Qty: 30 TABLET | Refills: 0 | Status: SHIPPED | OUTPATIENT
Start: 2022-09-07

## 2022-09-07 RX ORDER — BACILLUS COAGULANS/INULIN 1B-250 MG
1 CAPSULE ORAL DAILY
Qty: 30 CAPSULE | Refills: 0 | Status: SHIPPED | OUTPATIENT
Start: 2022-09-07

## 2022-09-07 RX ORDER — BUSPIRONE HYDROCHLORIDE 10 MG/1
10 TABLET ORAL 3 TIMES DAILY
Qty: 90 TABLET | Refills: 0 | Status: SHIPPED | OUTPATIENT
Start: 2022-09-07 | End: 2022-09-22 | Stop reason: ALTCHOICE

## 2022-09-07 RX ADMIN — ACETAMINOPHEN 650 MG: 325 TABLET, FILM COATED ORAL at 12:30

## 2022-09-07 RX ADMIN — SERTRALINE HYDROCHLORIDE 50 MG: 50 TABLET ORAL at 09:19

## 2022-09-07 RX ADMIN — BUSPIRONE HYDROCHLORIDE 10 MG: 10 TABLET ORAL at 15:27

## 2022-09-07 RX ADMIN — BUSPIRONE HYDROCHLORIDE 10 MG: 10 TABLET ORAL at 09:19

## 2022-09-07 ASSESSMENT — PAIN SCALES - GENERAL
PAINLEVEL_OUTOF10: 4
PAINLEVEL_OUTOF10: 2

## 2022-09-07 NOTE — GROUP NOTE
Group Therapy Note    Date: 9/7/2022    Group Start Time: 1100  Group End Time: 7834  Group Topic: Psychoeducation    IRENE BradleyS    Group Therapy Note    Attendees: 8     Patient's Goal:  Patient will identify healthy coping skills and ways to avoid negative coping. Notes:  Patient attended group and participated. Status After Intervention:  Improved    Participation Level:  Active Listener and Interactive    Participation Quality: Appropriate, Attentive, Sharing, and Supportive      Speech:  normal      Thought Process/Content: Logical  Linear      Affective Functioning: Congruent      Mood: euthymic      Level of consciousness:  Alert, Oriented x4, and Attentive      Response to Learning: Able to verbalize current knowledge/experience, Able to verbalize/acknowledge new learning, Able to retain information, Capable of insight, Able to change behavior, and Progressing to goal      Endings: None Reported    Modes of Intervention: Education, Support, Socialization, and Exploration      Discipline Responsible: Psychoeducational Specialist      Signature:  Ariella Lancaster, [address]

## 2022-09-07 NOTE — PROGRESS NOTES
CLINICAL PHARMACY NOTE: MEDS TO BEDS    Total # of Prescriptions Filled: 5   The following medications were delivered to the patient:  Acidophilus/Citrus Pectin Tabs  Buspirone HCL 10mg  Hydroxyzine HCL 50mg  Sertraline HCL 100mg  Trazodone HCL 50    Additional Documentation:  Delivered Medication to Hossein David

## 2022-09-07 NOTE — DISCHARGE INSTRUCTIONS
Information:  Medications:   Medication summary provided   I understand that I should take only the medications on my list.     -why and when I need to take each medicine.     -which side effects to watch for.     -that I should carry my medication information at all times in case of     Emergency situations. I will take all of my medicines to follow up appointments.     -check with my physician or pharmacist before taking any new    Medication, over the counter product or drink alcohol.    -Ask about food, drug or dietary supplement interactions.    -discard old lists and update records with medication providers. Notify Physician:  Notify physician if you notice:   Always call 911 if you feel your life is in danger  In case of an emergency call 911 immediately! If 911 is not available call your local emergency medical system for help    Behavioral Health Follow Up:  Original Referral Source:ED  Discharge Diagnosis: Depression with suicidal ideation [F32. A, R45.851]  Recommendations for Level of Care: follow up  Patient status at discharge: stable  [facility]  was[facility]  Aftercare plan faxed: [facility]   -faxed by: nurse   -date: 9/7   -time: 1400  Prescriptions: filled    Smoking: Quit Smoking. Call the [employer]'s smoking quitline at 2-479-67P-QUIT  Know the signs of a heart attack   If you have any of the following symptoms call 911 immediately, do not wait more    Than five minutes. 1. Pressure, fullness and/ or squeezing in the center of the chest spreading to    The jaw, neck or shoulder. 2. Chest discomfort with light headedness, fainting, sweating, nausea or    Shortness of breath. 3. Upper abdominal pressure or discomfort. 4. Lower chest pain, back pain, unusual fatigue, shortness of breath, nausea   Or dizziness.      General Information:   Questions regarding your bill: Call HELP program [phone]     Suicide Hotline ([facility])  (467) 322-1202 Recovery Help line- 724.917.1636      To obtain results of pending studies call Medical Records at: [phone]     For emergencies and 24 hour/7 days a week contact information:  [phone]

## 2022-09-07 NOTE — BH NOTE
[facility]  Discharge Note    Pt discharged with followings belongings:   Dental Appliances: None  Vision - Corrective Lenses: None  Hearing Aid: None  Jewelry: Body Piercing  Body Piercings Removed: No  Clothing: Footwear, Jacket/Coat, Pants, Shirt, Socks, Undergarments  Other Valuables: Money, Wallet, Personal Toiletries ($14.80)   Valuables sent home withpatient or returned to patient. Patient education on aftercare instructions: given  Information faxed to [facility] by nurse  at 5:45 PM .Patient verbalize understanding of AVS:  yes. Transported home by family   Status EXAM upon discharge:  Mental Status and Behavioral Exam  Normal: No  Level of Assistance: Independent/Self  Facial Expression: Flat  Affect: Blunt  Level of Consciousness: Alert  Frequency of Checks: 4 times per hour, close  Mood:Normal: No  Mood: Depressed, Anxious  Motor Activity:Normal: No  Motor Activity: Decreased  Eye Contact: Fair  Observed Behavior: Cooperative  Sexual Misconduct History: Current - no  Preception: [place] to person, [place] to time, [place] to place, [place] to situation  Attention:Normal: No  Attention: Distractible  Thought Processes: Circumstantial  Thought Content:Normal: No  Thought Content: Poverty of content  Depression Symptoms: Impaired concentration  Anxiety Symptoms: Generalized  Sharlene Symptoms: No problems reported or observed.   Hallucinations: None  Delusions: No  Memory:Normal: Yes  Memory: Poor recent, Poor remote  Insight and Judgment: No  Insight and Judgment: Poor insight    Tobacco Screening:  Practical Counseling, on admission, simin X, if applicable and completed (first 3 are required if patient doesn't refuse)r:            ( ) Recognizing danger situations (included triggers and roadblocks)                    ( ) Coping skills (new ways to manage stress,relaxation techniques, changing routine, distraction)                                                           ( ) Basic information about quitting (benefits of quitting, techniques in how to quit, available resources  ( ) Referral for counseling faxed to [facility]                                                                                                                   ( ) Patient refused counseling  ( ) Patient refused referral  ( ) Patient refused prescription upon discharge  ( ) Patient has not smoked in the last 30 days    Metabolic Screening:    No results found for: LABA1C    No results found for: CHOL  No results found for: TRIG  No results found for: HDL  No components found for: LDLCAL  No results found for: Nedra Diaz LPN
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
## 2022-09-08 NOTE — PROGRESS NOTES
Daily Progress Note  Alo Brennan MD  9/7/2022  CHIEF COMPLAINT:  depression with suicidal ideation    Reviewed patient's current plan of care and vital signs with nursing staff. Sleep:  7 hours last night  Attending groups: No:     SUBJECTIVE:    Olvin Pacheco is denying side effects of medication. She is reporting some improvement in her mood. She is reporting an absence of safety concerns here on the unit. Reports improvement in intensity and frequency of suicidal ideation. More constructive and forward looking. We did discuss possible discharge. Spent time psychotherapy discussing past substance use, triggers for relapse. Mental Status Exam  Level of consciousness:  Within normal limits  Appearance: Hospital attire, seated in chair, with good grooming and hygiene   Behavior/Motor: No abnormalities noted  Attitude toward examiner:  Cooperative, attentive, good eye contact  Speech:  spontaneous, normal rate, normal volume and well articulated  Mood: \"Better\"  Affect: Mood congruent  Thought processes:  linear, goal directed and coherent  Thought content:  denies homicidal ideation  Suicidal Ideation: Improving suicidal ideation  Delusions:  no evidence of delusions  Perceptual Disturbance:  denies any perceptual disturbance  Cognition:  Oriented to self, location, time, and situation  Memory: age appropriate  Insight & Judgement: improving  Medication side effects:  denies       Data   height is 5' 4\" (1.626 m) and weight is 220 lb (99.8 kg). Her temporal temperature is 98 °F (36.7 °C). Her blood pressure is 126/84 and her pulse is 87. Her respiration is 14.    Labs:   Admission on 09/04/2022, Discharged on 09/07/2022   Component Date Value Ref Range Status    WBC 09/05/2022 10.3  3.5 - 11.0 k/uL Final    RBC 09/05/2022 4.76  4.0 - 5.2 m/uL Final    Hemoglobin 09/05/2022 11.4 (A) 12.0 - 16.0 g/dL Final    Hematocrit 09/05/2022 35.3 (A) 36 - 46 % Final    MCV 09/05/2022 74.1 (A) 80 - 100 fL Final    MCH 09/05/2022 24.0 (A) 26 - 34 pg Final    MCHC 09/05/2022 32.4  31 - 37 g/dL Final    RDW 09/05/2022 15.6 (A) 11.5 - 14.9 % Final    Platelets 03/54/0546 275  150 - 450 k/uL Final    MPV 09/05/2022 8.6  6.0 - 12.0 fL Final    Seg Neutrophils 09/05/2022 64  36 - 66 % Final    Lymphocytes 09/05/2022 23 (A) 24 - 44 % Final    Monocytes 09/05/2022 8 (A) 1 - 7 % Final    Eosinophils % 09/05/2022 4  0 - 4 % Final    Basophils 09/05/2022 1  0 - 2 % Final    Segs Absolute 09/05/2022 6.60  1.3 - 9.1 k/uL Final    Absolute Lymph # 09/05/2022 2.37  1.0 - 4.8 k/uL Final    Absolute Mono # 09/05/2022 0.82  0.1 - 1.3 k/uL Final    Absolute Eos # 09/05/2022 0.41 (A) 0.0 - 0.4 k/uL Final    Basophils Absolute 09/05/2022 0.10  0.0 - 0.2 k/uL Final    Morphology 09/05/2022 ANISOCYTOSIS PRESENT   Final    Morphology 09/05/2022 MICROCYTOSIS PRESENT   Final    Morphology 09/05/2022 HYPOCHROMIA PRESENT   Final    Glucose 09/05/2022 95  70 - 99 mg/dL Final    BUN 09/05/2022 11  6 - 20 mg/dL Final    Creatinine 09/05/2022 0.68  0.50 - 0.90 mg/dL Final    Calcium 09/05/2022 9.3  8.6 - 10.4 mg/dL Final    Sodium 09/05/2022 137  135 - 144 mmol/L Final    Potassium 09/05/2022 4.3  3.7 - 5.3 mmol/L Final    Chloride 09/05/2022 100  98 - 107 mmol/L Final    CO2 09/05/2022 27  20 - 31 mmol/L Final    Anion Gap 09/05/2022 10  9 - 17 mmol/L Final    Alkaline Phosphatase 09/05/2022 63  35 - 104 U/L Final    ALT 09/05/2022 19  5 - 33 U/L Final    AST 09/05/2022 28  <32 U/L Final    Total Bilirubin 09/05/2022 0.3  0.3 - 1.2 mg/dL Final    Total Protein 09/05/2022 6.9  6.4 - 8.3 g/dL Final    Albumin 09/05/2022 4.0  3.5 - 5.2 g/dL Final    GFR Non- 09/05/2022 >60  >60 mL/min Final    GFR  09/05/2022 >60  >60 mL/min Final    GFR Comment 09/05/2022        Final    Comment: Average GFR for [age] years old:   116 mL/min/1.73sq m  Chronic Kidney Disease:   <60 mL/min/1.73sq m  Kidney failure:   <15 mL/min/1.73sq m eGFR calculated using average adult body mass. Additional eGFR calculator available at:        [email or web address]            TSH 09/05/2022 1.17  0.30 - 5.00 uIU/mL Final            Medications  No current facility-administered medications for this encounter. ASSESSMENT  Major depressive disorder, recurrent severe without psychotic features [facility])     PLAN  Patient s symptoms   are improving  Continue with current medication for now  Attempt to develop insight  Psycho-education conducted. Supportive Therapy conducted. Probable discharge is tomorrow  Follow-up daily while in the inpatient unit    More than 16 mins of the 30-minute session was spent doing Supportive psychotherapy. Electronically signed by Angi Murphy MD on 9/7/22 at 8:43 PM EDT    **This report has been created using voice recognition software. It may contain minor errors which are inherent in voice recognition technology. **

## 2022-09-09 LAB
BUPRENORPHINE GLUCURONIDE URINE: 93 NG/ML
BUPRENORPHINE URINE: <2 NG/ML
NALOXONE URINE: <100 NG/ML
NORBUPRENORPHINE GLUCURONIDE URINE: >1000 NG/ML
NORBUPRENORPHINE, URINE: 193 NG/ML

## 2022-09-13 NOTE — CARE COORDINATION
Name: Katt Robles    : 1995    Discharge Date: 2022    Primary Auth/Cert #: [id number]    Destination: home with family    Discharge Medications:      Medication List        START taking these medications      busPIRone 10 MG tablet  Commonly known as: BUSPAR  Take 1 tablet by mouth 3 times daily  Notes to patient: anxiety     hydrOXYzine HCl 50 MG tablet  Commonly known as: ATARAX  Take 1 tablet by mouth 3 times daily as needed for Anxiety  Notes to patient: anxiety     sertraline 100 MG tablet  Commonly known as: ZOLOFT  Take 1/2 tablet (50 mg) by mouth daily for 5 days, then increase to 1 full tablet by mouth daily thereafter (100mg)  Notes to patient: antidepressant     traZODone 50 MG tablet  Commonly known as: DESYREL  Take 1 tablet by mouth nightly as needed for Sleep  Notes to patient: sleep            CONTINUE taking these medications      Probiotic 1-250 BILLION-MG Caps  Take 1 capsule by mouth daily  Notes to patient: digestion            STOP taking these medications      buprenorphine-naloxone 8-2 MG Subl SL tablet  Commonly known as: SUBOXONE               Where to Get Your Medications        These medications were sent to [facility]  [address] [address]      Phone: [phone]   busPIRone 10 MG tablet  hydrOXYzine HCl 50 MG tablet  Probiotic 1-250 BILLION-MG Caps  sertraline 100 MG tablet  traZODone 50 MG tablet         Follow Up Appointment: [facility]  [place] [id number]    Call on 2022  [facility] staff will call you around 11:00 am    [facility]  [place] [id number]    Follow up on 2022  Pt has in person appointment at 1:15 pm

## 2022-09-22 ENCOUNTER — OFFICE VISIT (OUTPATIENT)
Dept: BARIATRICS/WEIGHT MGMT | Age: 27
End: 2022-09-22
Payer: MEDICARE

## 2022-09-22 VITALS
HEIGHT: 65 IN | DIASTOLIC BLOOD PRESSURE: 88 MMHG | SYSTOLIC BLOOD PRESSURE: 120 MMHG | WEIGHT: 220 LBS | HEART RATE: 90 BPM | BODY MASS INDEX: 36.65 KG/M2

## 2022-09-22 DIAGNOSIS — E66.09 CLASS 2 OBESITY DUE TO EXCESS CALORIES WITHOUT SERIOUS COMORBIDITY WITH BODY MASS INDEX (BMI) OF 36.0 TO 36.9 IN ADULT: ICD-10-CM

## 2022-09-22 DIAGNOSIS — R06.09 DYSPNEA ON EXERTION: Primary | ICD-10-CM

## 2022-09-22 DIAGNOSIS — R06.83 SNORING: ICD-10-CM

## 2022-09-22 PROCEDURE — G8417 CALC BMI ABV UP PARAM F/U: HCPCS | Performed by: SURGERY

## 2022-09-22 PROCEDURE — 99203 OFFICE O/P NEW LOW 30 MIN: CPT | Performed by: SURGERY

## 2022-09-22 PROCEDURE — 1111F DSCHRG MED/CURRENT MED MERGE: CPT | Performed by: SURGERY

## 2022-09-22 PROCEDURE — 1036F TOBACCO NON-USER: CPT | Performed by: SURGERY

## 2022-09-22 PROCEDURE — G8427 DOCREV CUR MEDS BY ELIG CLIN: HCPCS | Performed by: SURGERY

## 2022-09-22 RX ORDER — IBUPROFEN 800 MG/1
TABLET ORAL
COMMUNITY

## 2022-09-22 RX ORDER — ACETAMINOPHEN 500 MG
1000 TABLET ORAL PRN
COMMUNITY

## 2022-09-22 NOTE — PROGRESS NOTES
[address] [facility] INVASIVE BARIATRIC SURG  [address] [facility] [address]  [address] [place] [id number]  Dept: [phone]    SURGICAL WEIGHT MANAGEMENT PROGRAM  PROGRESS NOTE INITIAL EVALUATION     Patient: Lucius Castaneda        Service Date: 9/22/2022      HPI:     Chief Complaint   Patient presents with    Bariatric, Initial Visit    Weight Loss     New surgical patient       The patient is a pleasant 28y.o. year old female  with obesity, who stands Height: 5' 4.75\" (164.5 cm) tall with a weight of Weight: 220 lb (99.8 kg) , resulting in a BMI of Body mass index is 36.89 kg/m². The patient suffers from multiple co-morbidities as a result of morbid obesity, including: Depression, dyspnea on exertion and snoring. She has suffered from obesity for many years. She notes a history of hypertension, which has previously been managed with medication, but she is not currently taking medications. She notes a history of Lupus, for which she has previously followed with a rheumatologist a few years ago. She denies any previous testing for sleep apnea. She notes that she does snore nightly. The patient denies  a history of myocardial infarction, deep vein thrombosis, pulmonary embolism, renal failure, hepatic failure, stroke, and seizure. She denies a history of smoking. The patient has failed multiple attempts at non-surgical weight loss, and is now seeking surgical intervention to promote permanent and consistent weight loss. She  has chosen Sleeve Gastrectomy. She is well educated regarding it, as she has recently viewed our weight loss surgery informational seminar . Medical History:  Past Medical History:   Diagnosis Date    Endometriosis     GERD (gastroesophageal reflux disease)     History of miscarriage        Surgical History:  No past surgical history on file. Family History:  No family history on file.     Social History:   Social History     Tobacco Use    Smoking status: Former     Types: Cigarettes    Smokeless tobacco: Never   Substance Use Topics    Alcohol use: Not Currently    Drug use: Yes     Types: Other-see comments     Comment: 10norco/day, fentanyl patches, marijuana, ecstacy       Current Med List:  Current Outpatient Medications   Medication Sig Dispense Refill    ibuprofen (ADVIL;MOTRIN) 800 MG tablet ibuprofen   800mg , PRN      acetaminophen (TYLENOL) 500 MG tablet Take 1,000 mg by mouth as needed      Bacillus Coagulans-Inulin (PROBIOTIC) 1-250 BILLION-MG CAPS Take 1 capsule by mouth daily 30 capsule 0    sertraline (ZOLOFT) 100 MG tablet Take 1/2 tablet (50 mg) by mouth daily for 5 days, then increase to 1 full tablet by mouth daily thereafter (100mg) 30 tablet 0    traZODone (DESYREL) 50 MG tablet Take 1 tablet by mouth nightly as needed for Sleep 30 tablet 0     No current facility-administered medications for this visit. SOCIAL:      This patient is alone for the evaluation today.       [] HIV Risk Factors (i.e.) intravenous drug abuser; at risk sexual behavior; received blood products    [] TB Risk Factors (i.e.) Medically underserved, institutional care, foreign born, endemic area; exposure to active case    [] Hepatitis B&C Risk Factors (i.e.) Received blood transfusion prior to 1992; recreational drug use; high risk sexual behaviors; tattoos or body piercings; contact with blood or needle sticks in the workplace    Comprehension    Ability to grasp concepts and respond to questions:   [x] High   [] Medium   [] Low    Motivation    [x] Asks Questions; eager to learn   [] Needs education   [] Extreme anxiety    [] uncooperative   [] Denies need for education    English Speaking Ability    [x] Speaks English well   [x] Reads English well   [x] Understands spoken english    [x] Understands written English   [] No need for interpretive support      [] Might benefit from interpretive support   []  required for all services REVIEW OF SYSTEMS: (Negative unless marked otherwise)     See review of Systems scanned into media    PRESENT ILLNESS:     Weight Parameters  Weight 220 lb (99.8 kg)   Height 5' 4.75\" (1.645 m)   BMI Body mass index is 36.89 kg/m². IBW     EBW               IMMUNIZATION STATUS    There is no immunization history on file for this patient. FALLS ASSESSMENT    [x] LOW RISK FOR FALLS    [] MODERATE RISK FOR FALLS    [] Difficulty walking/selfcare    [] Falls in the past 2 months    [] Suspicion of Clinician    [] Other:      SMOKING CESSATION     [x] Not needed     [] Instructed to stop smoking    [] Pamphlet community resources given     VTE SCREEN    [] Family hx DVT/PE  /   [] Personal hx of DVT/PE    [x] Denies any family or personal hx of DVT/PE    Physician Review    [x] Past medical, family, & social history reviewed and discussed with patient. Review of surgery and post-surgical changes (by surgeon for surgical patients only)    [x] Lifelong diet expectations reviewed with patient    [x] Need for lifelong vitamin supplementation reviewed with patient    PHYSICAL EXAMINATION:      /88 (Site: Right Upper Arm, Position: Sitting, Cuff Size: Large Adult)   Pulse 90   Ht 5' 4.75\" (1.645 m)   Wt 220 lb (99.8 kg)   BMI 36.89 kg/m²     Constitutional:  Vital signs are normal. The patient appears well-developed   HEENT:      Head: Normocephalic. Atraumatic     Eyes: pupils are equal and reactive. No scleral icterus is present. Neck: No mass and no thyromegaly present. Cardiovascular: Normal rate, regular rhythm, S1 normal and S2 normal.  Bilateral pulses present. Pulmonary/Chest: Effort normal and breath sounds normal. No retractions. Abdominal: Soft. Normal appearance. There is no organomegaly. No tenderness. There is no rigidity, no rebound, no guarding and no Graf's sign. Musculoskeletal:      Right lower leg: Normal. No tenderness and no edema.       Left lower leg: Normal. No surgery. I discussed the procedure in detail with the candidacy requirements. She would like to proceed with a sleep study to evaluate for sleep apnea. She is going to get a sleep study. If positive she would be a candidate for bariatric surgery. We discussed medical and surgical weight loss options    Follow up as needed. Scribe Attestation:  Gordo Rinaldi, scribed on behalf of Nino Estevez DO. Electronically signed by Nino Estevez DO on 9/29/2022 at 8:59 PM    I, Nino Estevez DO DO, personally performed the services described in this documentation. All medical record entries made by the scribe were at my direction and in my presence. I have reviewed the chart and discharge instructions (if applicable) and agree that the record reflects my personal performance and is accurate and complete.     Electronically Signed: Nino Estevez DO. 09/29/22. 8:59 PM.

## 2022-10-07 ENCOUNTER — TELEPHONE (OUTPATIENT)
Dept: BARIATRICS/WEIGHT MGMT | Age: 27
End: 2022-10-07

## 2022-10-07 NOTE — TELEPHONE ENCOUNTER
Called pt to schedule initial dietary consult for bariatric surgery, no voicemail set up. Will call back at later date.

## 2022-10-28 ENCOUNTER — HOSPITAL ENCOUNTER (OUTPATIENT)
Dept: SLEEP CENTER | Age: 27
Discharge: HOME | End: 2022-10-30
Payer: MEDICARE

## 2022-10-28 VITALS — HEIGHT: 64 IN | WEIGHT: 220 LBS | BODY MASS INDEX: 37.56 KG/M2

## 2022-10-28 DIAGNOSIS — R06.83 SNORING: ICD-10-CM

## 2022-10-28 PROCEDURE — 95810 POLYSOM 6/> YRS 4/> PARAM: CPT

## 2022-11-18 LAB — STATUS: NORMAL

## 2022-12-08 ENCOUNTER — TELEPHONE (OUTPATIENT)
Dept: BARIATRICS/WEIGHT MGMT | Age: 27
End: 2022-12-08

## 2022-12-09 NOTE — TELEPHONE ENCOUNTER
Patient was seen on 9/22/22. Sleep study ordered and was negative. Plan:  Due to her BMI and co-morbidities, she is not currently a candidate for bariatric surgery. I discussed the procedure in detail with the candidacy requirements. She would like to proceed with a sleep study to evaluate for sleep apnea. She is going to get a sleep study. If positive she would be a candidate for bariatric surgery. We discussed medical and surgical weight loss options       Patient calls states she now has diabetes, hypertension and thyroid issues. I do not see anything in her chart to support these conditions.  Please advise if she can start the program ?

## 2022-12-12 NOTE — TELEPHONE ENCOUNTER
Attempted to call patient back to schedule initial dietician visits and her phone is now disconnected.

## 2023-04-17 ENCOUNTER — HOSPITAL ENCOUNTER (INPATIENT)
Age: 28
LOS: 2 days | Discharge: ANOTHER HOSPITAL | End: 2023-04-19
Attending: EMERGENCY MEDICINE | Admitting: INTERNAL MEDICINE
Payer: MEDICAID

## 2023-04-17 ENCOUNTER — APPOINTMENT (OUTPATIENT)
Dept: CT IMAGING | Age: 28
End: 2023-04-17
Payer: MEDICAID

## 2023-04-17 ENCOUNTER — APPOINTMENT (OUTPATIENT)
Dept: GENERAL RADIOLOGY | Age: 28
End: 2023-04-17
Payer: MEDICAID

## 2023-04-17 DIAGNOSIS — R09.02 HYPOXIA: Primary | ICD-10-CM

## 2023-04-17 PROBLEM — J18.9 PNEUMONIA DUE TO INFECTIOUS ORGANISM: Status: ACTIVE | Noted: 2023-04-17

## 2023-04-17 LAB
ABSOLUTE EOS #: 1 K/UL (ref 0–0.4)
ABSOLUTE LYMPH #: 3.1 K/UL (ref 1–4.8)
ABSOLUTE MONO #: 1.3 K/UL (ref 0.1–1.2)
ALBUMIN SERPL-MCNC: 4.6 G/DL (ref 3.5–5.2)
ALBUMIN/GLOBULIN RATIO: 1.5 (ref 1–2.5)
ALP SERPL-CCNC: 69 U/L (ref 35–104)
ALT SERPL-CCNC: 18 U/L (ref 5–33)
ANION GAP SERPL CALCULATED.3IONS-SCNC: 12 MMOL/L (ref 9–17)
AST SERPL-CCNC: 13 U/L
BASOPHILS # BLD: 1 % (ref 0–2)
BASOPHILS ABSOLUTE: 0.1 K/UL (ref 0–0.2)
BILIRUB SERPL-MCNC: 0.4 MG/DL (ref 0.3–1.2)
BUN SERPL-MCNC: 7 MG/DL (ref 6–20)
CALCIUM SERPL-MCNC: 9.7 MG/DL (ref 8.6–10.4)
CHLORIDE SERPL-SCNC: 101 MMOL/L (ref 98–107)
CO2 SERPL-SCNC: 26 MMOL/L (ref 20–31)
CREAT SERPL-MCNC: 0.76 MG/DL (ref 0.5–0.9)
D DIMER BLD IA.RAPID-MCNC: 0.44 MG/L FEU
EOSINOPHILS RELATIVE PERCENT: 8 % (ref 1–4)
GFR SERPL CREATININE-BSD FRML MDRD: >60 ML/MIN/1.73M2
GLUCOSE SERPL-MCNC: 82 MG/DL (ref 70–99)
HCG QUALITATIVE: NEGATIVE
HCT VFR BLD AUTO: 40.2 % (ref 36–46)
HGB BLD-MCNC: 12.7 G/DL (ref 12–16)
LYMPHOCYTES # BLD: 26 % (ref 24–44)
MCH RBC QN AUTO: 24.2 PG (ref 26–34)
MCHC RBC AUTO-ENTMCNC: 31.6 G/DL (ref 31–37)
MCV RBC AUTO: 76.4 FL (ref 80–100)
MONOCYTES # BLD: 11 % (ref 2–11)
PDW BLD-RTO: 16.5 % (ref 12.5–15.4)
PLATELET # BLD AUTO: 433 K/UL (ref 140–450)
PMV BLD AUTO: 8.4 FL (ref 6–12)
POTASSIUM SERPL-SCNC: 3.8 MMOL/L (ref 3.7–5.3)
PROT SERPL-MCNC: 7.7 G/DL (ref 6.4–8.3)
RBC # BLD: 5.26 M/UL (ref 4–5.2)
SEG NEUTROPHILS: 54 % (ref 36–66)
SEGMENTED NEUTROPHILS ABSOLUTE COUNT: 6.4 K/UL (ref 1.8–7.7)
SODIUM SERPL-SCNC: 139 MMOL/L (ref 135–144)
WBC # BLD AUTO: 11.8 K/UL (ref 3.5–11)

## 2023-04-17 PROCEDURE — 85379 FIBRIN DEGRADATION QUANT: CPT

## 2023-04-17 PROCEDURE — 96365 THER/PROPH/DIAG IV INF INIT: CPT

## 2023-04-17 PROCEDURE — 71045 X-RAY EXAM CHEST 1 VIEW: CPT

## 2023-04-17 PROCEDURE — 85025 COMPLETE CBC W/AUTO DIFF WBC: CPT

## 2023-04-17 PROCEDURE — 71250 CT THORAX DX C-: CPT

## 2023-04-17 PROCEDURE — 87205 SMEAR GRAM STAIN: CPT

## 2023-04-17 PROCEDURE — 87040 BLOOD CULTURE FOR BACTERIA: CPT

## 2023-04-17 PROCEDURE — 6370000000 HC RX 637 (ALT 250 FOR IP): Performed by: EMERGENCY MEDICINE

## 2023-04-17 PROCEDURE — 6360000002 HC RX W HCPCS: Performed by: EMERGENCY MEDICINE

## 2023-04-17 PROCEDURE — 99285 EMERGENCY DEPT VISIT HI MDM: CPT

## 2023-04-17 PROCEDURE — 87449 NOS EACH ORGANISM AG IA: CPT

## 2023-04-17 PROCEDURE — 2580000003 HC RX 258: Performed by: NURSE PRACTITIONER

## 2023-04-17 PROCEDURE — 2580000003 HC RX 258: Performed by: EMERGENCY MEDICINE

## 2023-04-17 PROCEDURE — 84703 CHORIONIC GONADOTROPIN ASSAY: CPT

## 2023-04-17 PROCEDURE — 87641 MR-STAPH DNA AMP PROBE: CPT

## 2023-04-17 PROCEDURE — 86738 MYCOPLASMA ANTIBODY: CPT

## 2023-04-17 PROCEDURE — 36415 COLL VENOUS BLD VENIPUNCTURE: CPT

## 2023-04-17 PROCEDURE — 87070 CULTURE OTHR SPECIMN AEROBIC: CPT

## 2023-04-17 PROCEDURE — 80053 COMPREHEN METABOLIC PANEL: CPT

## 2023-04-17 PROCEDURE — 1210000000 HC MED SURG R&B

## 2023-04-17 RX ORDER — ACETAMINOPHEN 500 MG
1000 TABLET ORAL ONCE
Status: COMPLETED | OUTPATIENT
Start: 2023-04-17 | End: 2023-04-17

## 2023-04-17 RX ORDER — TRAZODONE HYDROCHLORIDE 50 MG/1
50 TABLET ORAL NIGHTLY PRN
Status: DISCONTINUED | OUTPATIENT
Start: 2023-04-17 | End: 2023-04-19 | Stop reason: HOSPADM

## 2023-04-17 RX ORDER — ALBUTEROL SULFATE 2.5 MG/3ML
2.5 SOLUTION RESPIRATORY (INHALATION) EVERY 6 HOURS PRN
COMMUNITY

## 2023-04-17 RX ORDER — LEVOFLOXACIN 750 MG/1
750 TABLET ORAL DAILY
Status: ON HOLD | COMMUNITY
End: 2023-04-20 | Stop reason: HOSPADM

## 2023-04-17 RX ORDER — QUETIAPINE FUMARATE 25 MG/1
50 TABLET, FILM COATED ORAL ONCE
Status: DISCONTINUED | OUTPATIENT
Start: 2023-04-18 | End: 2023-04-18

## 2023-04-17 RX ORDER — SODIUM CHLORIDE 9 MG/ML
INJECTION, SOLUTION INTRAVENOUS CONTINUOUS
Status: DISCONTINUED | OUTPATIENT
Start: 2023-04-17 | End: 2023-04-19 | Stop reason: HOSPADM

## 2023-04-17 RX ADMIN — ACETAMINOPHEN 1000 MG: 500 TABLET ORAL at 22:47

## 2023-04-17 RX ADMIN — VANCOMYCIN HYDROCHLORIDE 1250 MG: 1 INJECTION, POWDER, LYOPHILIZED, FOR SOLUTION INTRAVENOUS at 22:47

## 2023-04-17 RX ADMIN — SODIUM CHLORIDE: 9 INJECTION, SOLUTION INTRAVENOUS at 23:44

## 2023-04-17 RX ADMIN — PIPERACILLIN AND TAZOBACTAM 3375 MG: 3; .375 INJECTION, POWDER, LYOPHILIZED, FOR SOLUTION INTRAVENOUS at 22:04

## 2023-04-17 ASSESSMENT — PAIN DESCRIPTION - LOCATION
LOCATION: GENERALIZED
LOCATION: GENERALIZED

## 2023-04-17 ASSESSMENT — PAIN - FUNCTIONAL ASSESSMENT: PAIN_FUNCTIONAL_ASSESSMENT: 0-10

## 2023-04-17 ASSESSMENT — PAIN SCALES - GENERAL
PAINLEVEL_OUTOF10: 8
PAINLEVEL_OUTOF10: 4

## 2023-04-18 VITALS
SYSTOLIC BLOOD PRESSURE: 127 MMHG | BODY MASS INDEX: 34.15 KG/M2 | HEIGHT: 64 IN | RESPIRATION RATE: 18 BRPM | TEMPERATURE: 98.3 F | WEIGHT: 200 LBS | DIASTOLIC BLOOD PRESSURE: 82 MMHG | OXYGEN SATURATION: 96 % | HEART RATE: 60 BPM

## 2023-04-18 PROBLEM — R09.02 HYPOXIA: Status: ACTIVE | Noted: 2023-04-18

## 2023-04-18 PROBLEM — J40 BRONCHITIS: Status: ACTIVE | Noted: 2023-04-18

## 2023-04-18 LAB
L PNEUMO1 AG UR QL IA.RAPID: NEGATIVE
MICROORGANISM SPEC CULT: ABNORMAL
MICROORGANISM/AGENT SPEC: ABNORMAL
SARS-COV-2 RDRP RESP QL NAA+PROBE: NOT DETECTED
SOURCE: NORMAL
SPECIMEN DESCRIPTION: ABNORMAL
SPECIMEN DESCRIPTION: NORMAL
STREP PNEUMONIAE ANTIGEN: NEGATIVE

## 2023-04-18 PROCEDURE — 94760 N-INVAS EAR/PLS OXIMETRY 1: CPT

## 2023-04-18 PROCEDURE — 6360000002 HC RX W HCPCS: Performed by: STUDENT IN AN ORGANIZED HEALTH CARE EDUCATION/TRAINING PROGRAM

## 2023-04-18 PROCEDURE — 6370000000 HC RX 637 (ALT 250 FOR IP): Performed by: STUDENT IN AN ORGANIZED HEALTH CARE EDUCATION/TRAINING PROGRAM

## 2023-04-18 PROCEDURE — 94640 AIRWAY INHALATION TREATMENT: CPT

## 2023-04-18 PROCEDURE — 99222 1ST HOSP IP/OBS MODERATE 55: CPT | Performed by: INTERNAL MEDICINE

## 2023-04-18 PROCEDURE — 87899 AGENT NOS ASSAY W/OPTIC: CPT

## 2023-04-18 PROCEDURE — 87635 SARS-COV-2 COVID-19 AMP PRB: CPT

## 2023-04-18 PROCEDURE — 2580000003 HC RX 258: Performed by: STUDENT IN AN ORGANIZED HEALTH CARE EDUCATION/TRAINING PROGRAM

## 2023-04-18 PROCEDURE — 6370000000 HC RX 637 (ALT 250 FOR IP): Performed by: NURSE PRACTITIONER

## 2023-04-18 PROCEDURE — 6360000002 HC RX W HCPCS: Performed by: NURSE PRACTITIONER

## 2023-04-18 PROCEDURE — 6370000000 HC RX 637 (ALT 250 FOR IP): Performed by: EMERGENCY MEDICINE

## 2023-04-18 PROCEDURE — 2580000003 HC RX 258: Performed by: NURSE PRACTITIONER

## 2023-04-18 PROCEDURE — 2700000000 HC OXYGEN THERAPY PER DAY

## 2023-04-18 PROCEDURE — 1210000000 HC MED SURG R&B

## 2023-04-18 PROCEDURE — 99232 SBSQ HOSP IP/OBS MODERATE 35: CPT | Performed by: STUDENT IN AN ORGANIZED HEALTH CARE EDUCATION/TRAINING PROGRAM

## 2023-04-18 RX ORDER — ALBUTEROL SULFATE 2.5 MG/3ML
2.5 SOLUTION RESPIRATORY (INHALATION)
Status: DISCONTINUED | OUTPATIENT
Start: 2023-04-18 | End: 2023-04-18

## 2023-04-18 RX ORDER — SODIUM CHLORIDE 9 MG/ML
INJECTION, SOLUTION INTRAVENOUS PRN
Status: DISCONTINUED | OUTPATIENT
Start: 2023-04-18 | End: 2023-04-19 | Stop reason: HOSPADM

## 2023-04-18 RX ORDER — CYCLOBENZAPRINE HCL 10 MG
10 TABLET ORAL NIGHTLY
Status: DISCONTINUED | OUTPATIENT
Start: 2023-04-18 | End: 2023-04-18

## 2023-04-18 RX ORDER — NADOLOL 20 MG/1
40 TABLET ORAL DAILY
Status: DISCONTINUED | OUTPATIENT
Start: 2023-04-18 | End: 2023-04-19 | Stop reason: HOSPADM

## 2023-04-18 RX ORDER — BENZONATATE 100 MG/1
100 CAPSULE ORAL 3 TIMES DAILY PRN
Status: DISCONTINUED | OUTPATIENT
Start: 2023-04-18 | End: 2023-04-19 | Stop reason: HOSPADM

## 2023-04-18 RX ORDER — IPRATROPIUM BROMIDE AND ALBUTEROL SULFATE 2.5; .5 MG/3ML; MG/3ML
1 SOLUTION RESPIRATORY (INHALATION) ONCE
Status: COMPLETED | OUTPATIENT
Start: 2023-04-18 | End: 2023-04-18

## 2023-04-18 RX ORDER — ALBUTEROL SULFATE 2.5 MG/3ML
2.5 SOLUTION RESPIRATORY (INHALATION) EVERY 4 HOURS PRN
Status: DISCONTINUED | OUTPATIENT
Start: 2023-04-18 | End: 2023-04-19 | Stop reason: HOSPADM

## 2023-04-18 RX ORDER — ONDANSETRON 2 MG/ML
4 INJECTION INTRAMUSCULAR; INTRAVENOUS EVERY 6 HOURS PRN
Status: DISCONTINUED | OUTPATIENT
Start: 2023-04-18 | End: 2023-04-19 | Stop reason: HOSPADM

## 2023-04-18 RX ORDER — ACETAMINOPHEN 325 MG/1
650 TABLET ORAL EVERY 6 HOURS PRN
Status: DISCONTINUED | OUTPATIENT
Start: 2023-04-18 | End: 2023-04-19 | Stop reason: HOSPADM

## 2023-04-18 RX ORDER — SEMAGLUTIDE 1.34 MG/ML
INJECTION, SOLUTION SUBCUTANEOUS
Status: ON HOLD | COMMUNITY
Start: 2023-03-08 | End: 2023-04-20 | Stop reason: HOSPADM

## 2023-04-18 RX ORDER — ONDANSETRON 4 MG/1
4 TABLET, ORALLY DISINTEGRATING ORAL EVERY 8 HOURS PRN
Status: DISCONTINUED | OUTPATIENT
Start: 2023-04-18 | End: 2023-04-19 | Stop reason: HOSPADM

## 2023-04-18 RX ORDER — ALBUTEROL SULFATE 2.5 MG/3ML
2.5 SOLUTION RESPIRATORY (INHALATION) 2 TIMES DAILY
Status: DISCONTINUED | OUTPATIENT
Start: 2023-04-18 | End: 2023-04-19 | Stop reason: HOSPADM

## 2023-04-18 RX ORDER — ENOXAPARIN SODIUM 100 MG/ML
40 INJECTION SUBCUTANEOUS DAILY
Status: DISCONTINUED | OUTPATIENT
Start: 2023-04-18 | End: 2023-04-19 | Stop reason: HOSPADM

## 2023-04-18 RX ORDER — CYCLOBENZAPRINE HCL 10 MG
10 TABLET ORAL 2 TIMES DAILY PRN
COMMUNITY
Start: 2023-04-03

## 2023-04-18 RX ORDER — SODIUM CHLORIDE 0.9 % (FLUSH) 0.9 %
5-40 SYRINGE (ML) INJECTION PRN
Status: DISCONTINUED | OUTPATIENT
Start: 2023-04-18 | End: 2023-04-19 | Stop reason: HOSPADM

## 2023-04-18 RX ORDER — DIPHENHYDRAMINE HYDROCHLORIDE 50 MG/ML
25 INJECTION INTRAMUSCULAR; INTRAVENOUS ONCE
Status: COMPLETED | OUTPATIENT
Start: 2023-04-18 | End: 2023-04-18

## 2023-04-18 RX ORDER — IPRATROPIUM BROMIDE AND ALBUTEROL SULFATE 2.5; .5 MG/3ML; MG/3ML
SOLUTION RESPIRATORY (INHALATION)
Status: DISCONTINUED
Start: 2023-04-18 | End: 2023-04-18

## 2023-04-18 RX ORDER — POLYETHYLENE GLYCOL 3350 17 G/17G
17 POWDER, FOR SOLUTION ORAL DAILY PRN
Status: DISCONTINUED | OUTPATIENT
Start: 2023-04-18 | End: 2023-04-19 | Stop reason: HOSPADM

## 2023-04-18 RX ORDER — DIPHENHYDRAMINE HYDROCHLORIDE 50 MG/ML
50 INJECTION INTRAMUSCULAR; INTRAVENOUS ONCE
Status: DISCONTINUED | OUTPATIENT
Start: 2023-04-18 | End: 2023-04-18

## 2023-04-18 RX ORDER — LEVOFLOXACIN 5 MG/ML
750 INJECTION, SOLUTION INTRAVENOUS EVERY 24 HOURS
Status: DISCONTINUED | OUTPATIENT
Start: 2023-04-18 | End: 2023-04-18

## 2023-04-18 RX ORDER — BUPRENORPHINE 2 MG/1
22 TABLET SUBLINGUAL
Status: ON HOLD | COMMUNITY
Start: 2023-03-21 | End: 2023-04-20 | Stop reason: HOSPADM

## 2023-04-18 RX ORDER — LEVOTHYROXINE SODIUM 0.03 MG/1
25 TABLET ORAL DAILY
Status: DISCONTINUED | OUTPATIENT
Start: 2023-04-18 | End: 2023-04-19 | Stop reason: HOSPADM

## 2023-04-18 RX ORDER — LEVOTHYROXINE SODIUM 0.03 MG/1
TABLET ORAL
COMMUNITY
Start: 2023-03-29

## 2023-04-18 RX ORDER — TIZANIDINE 4 MG/1
4 TABLET ORAL NIGHTLY
Status: DISCONTINUED | OUTPATIENT
Start: 2023-04-18 | End: 2023-04-19 | Stop reason: HOSPADM

## 2023-04-18 RX ORDER — BUTALBITAL, ACETAMINOPHEN AND CAFFEINE 50; 325; 40 MG/1; MG/1; MG/1
1 TABLET ORAL EVERY 4 HOURS PRN
COMMUNITY
Start: 2023-04-12

## 2023-04-18 RX ORDER — SODIUM CHLORIDE 0.9 % (FLUSH) 0.9 %
5-40 SYRINGE (ML) INJECTION EVERY 12 HOURS SCHEDULED
Status: DISCONTINUED | OUTPATIENT
Start: 2023-04-18 | End: 2023-04-19 | Stop reason: HOSPADM

## 2023-04-18 RX ORDER — NADOLOL 40 MG/1
TABLET ORAL
COMMUNITY
Start: 2023-03-29

## 2023-04-18 RX ORDER — ALBUTEROL SULFATE 2.5 MG/3ML
2.5 SOLUTION RESPIRATORY (INHALATION) EVERY 6 HOURS PRN
Status: DISCONTINUED | OUTPATIENT
Start: 2023-04-18 | End: 2023-04-18

## 2023-04-18 RX ORDER — BUDESONIDE AND FORMOTEROL FUMARATE DIHYDRATE 160; 4.5 UG/1; UG/1
2 AEROSOL RESPIRATORY (INHALATION) 2 TIMES DAILY
Status: DISCONTINUED | OUTPATIENT
Start: 2023-04-18 | End: 2023-04-19 | Stop reason: HOSPADM

## 2023-04-18 RX ORDER — BUPRENORPHINE HYDROCHLORIDE 8 MG/1
8 TABLET SUBLINGUAL DAILY
Status: DISCONTINUED | OUTPATIENT
Start: 2023-04-18 | End: 2023-04-18

## 2023-04-18 RX ORDER — PREGABALIN 75 MG/1
CAPSULE ORAL
COMMUNITY
Start: 2023-04-03

## 2023-04-18 RX ORDER — IPRATROPIUM BROMIDE AND ALBUTEROL SULFATE 2.5; .5 MG/3ML; MG/3ML
1 SOLUTION RESPIRATORY (INHALATION)
Status: DISCONTINUED | OUTPATIENT
Start: 2023-04-18 | End: 2023-04-18

## 2023-04-18 RX ORDER — PREGABALIN 75 MG/1
75 CAPSULE ORAL DAILY
Status: DISCONTINUED | OUTPATIENT
Start: 2023-04-18 | End: 2023-04-19 | Stop reason: HOSPADM

## 2023-04-18 RX ADMIN — SODIUM CHLORIDE: 9 INJECTION, SOLUTION INTRAVENOUS at 06:50

## 2023-04-18 RX ADMIN — BUDESONIDE AND FORMOTEROL FUMARATE DIHYDRATE 2 PUFF: 160; 4.5 AEROSOL RESPIRATORY (INHALATION) at 19:48

## 2023-04-18 RX ADMIN — PIPERACILLIN AND TAZOBACTAM 3375 MG: 3; .375 INJECTION, POWDER, LYOPHILIZED, FOR SOLUTION INTRAVENOUS at 05:59

## 2023-04-18 RX ADMIN — PREGABALIN 75 MG: 75 CAPSULE ORAL at 13:04

## 2023-04-18 RX ADMIN — NADOLOL 40 MG: 20 TABLET ORAL at 13:04

## 2023-04-18 RX ADMIN — DIPHENHYDRAMINE HYDROCHLORIDE 25 MG: 50 INJECTION, SOLUTION INTRAMUSCULAR; INTRAVENOUS at 00:29

## 2023-04-18 RX ADMIN — IPRATROPIUM BROMIDE AND ALBUTEROL SULFATE 1 AMPULE: .5; 2.5 SOLUTION RESPIRATORY (INHALATION) at 01:02

## 2023-04-18 RX ADMIN — ACETAMINOPHEN 650 MG: 325 TABLET ORAL at 11:28

## 2023-04-18 RX ADMIN — LEVOFLOXACIN 750 MG: 5 INJECTION, SOLUTION INTRAVENOUS at 11:41

## 2023-04-18 RX ADMIN — ALBUTEROL SULFATE 2.5 MG: 2.5 SOLUTION RESPIRATORY (INHALATION) at 19:48

## 2023-04-18 RX ADMIN — VANCOMYCIN HYDROCHLORIDE 1500 MG: 1.5 INJECTION, POWDER, LYOPHILIZED, FOR SOLUTION INTRAVENOUS at 16:12

## 2023-04-18 RX ADMIN — BUDESONIDE AND FORMOTEROL FUMARATE DIHYDRATE 2 PUFF: 160; 4.5 AEROSOL RESPIRATORY (INHALATION) at 12:19

## 2023-04-18 RX ADMIN — ACETAMINOPHEN 650 MG: 325 TABLET ORAL at 17:31

## 2023-04-18 RX ADMIN — BUPRENORPHINE 22 MG: 2 TABLET SUBLINGUAL at 16:02

## 2023-04-18 RX ADMIN — SODIUM CHLORIDE, PRESERVATIVE FREE 10 ML: 5 INJECTION INTRAVENOUS at 20:59

## 2023-04-18 RX ADMIN — LEVOTHYROXINE SODIUM 25 MCG: 0.03 TABLET ORAL at 13:04

## 2023-04-18 RX ADMIN — ALBUTEROL SULFATE 2.5 MG: 2.5 SOLUTION RESPIRATORY (INHALATION) at 12:19

## 2023-04-18 RX ADMIN — TIZANIDINE 4 MG: 4 TABLET ORAL at 21:47

## 2023-04-18 NOTE — PROGRESS NOTES
[address] [facility] Pharmacokinetic Monitoring Service - Vancomycin     Alcira Duncan is a 28 y.o. female starting on vancomycin therapy for pneumonia. Pharmacy consulted by Gualberto Barboza for monitoring and adjustment. Target Concentration: Goal AUC/JEAN 400-600 mg*hr/L    Additional Antimicrobials: Zosyn    Pertinent Laboratory Values: Wt Readings from Last 1 Encounters:   04/17/23 200 lb (90.7 kg)     Temp Readings from Last 1 Encounters:   04/17/23 98.6 °F (37 °C) (Oral)     Estimated Creatinine Clearance: 120 mL/min (based on SCr of 0.76 mg/dL). Recent Labs     04/17/23 2032   CREATININE 0.76   WBC 11.8*     Procalcitonin:     Pertinent Cultures:  Culture Date Source Results   pending     MRSA Nasal Swab: not ordered. Order placed by pharmacy. Plan:  Dosing recommendations based on Bayesian software  Start vancomycin 1500 mg every 12 hours  Anticipated AUC of 552 and trough concentration of 16.4 at steady state  Renal labs as indicated     Pharmacy will continue to monitor patient and adjust therapy as indicated    Elena Gilmore.  Cristi,   PharmD  4/18/2023 3:48 PM

## 2023-04-18 NOTE — ED PROVIDER NOTES
[address] [facility] Surg ICU  [address] [facility] [address]  Phone: [phone]      Pt Name: Ollie Vo  [id number]  [place] 1995  Date of evaluation: 4/17/2023      CHIEF COMPLAINT       Chief Complaint   Patient presents with    Hemoptysis     Recent Dx of legionella Pneu- Levofloxicin x2 days/, reports coughing up thick dark green-brown sputum , on 2-3L NC for approx 5 weeks        HISTORY OF PRESENT ILLNESS   Ollie Vo is a 28 y.o. female who presents for evaluation of shortness of breath and hypoxia. The patient reports that she was recently admitted to [facility] from 3/17/2023 through 3/21/2023 after she was diagnosed with Mycobacterium pneumonia and Legionella pneumonia. The patient states that she was discharged home on supplemental oxygen via nasal cannula, Advair, albuterol, and breathing treatments. She is still on Levaquin. The patient states that she felt better for the 2 weeks after being discharged but for the past 1 week has had return of symptoms that seem to be getting worse. She has hypoxia with any movement and sometimes at rest down to the mid 80s. She is requiring 2 to 3 L of oxygen at all times. The patient never saw infectious disease when she was at [facility] because they do not have that available. She has been managed by her pulmonologist, Dr. Mya Ferro. The patient tried reaching her pulmonologist today but was unsuccessful. She states that she presented to [facility] emergency department but was discharged after breathing treatment. The patient states that she is still spiking fevers with last fever of 101F approximately 2 days ago. She complains of associated chest tightness and shortness of breath. Today she had streaks of blood in her sputum. She denies any history of PE. The patient was tested again for Legionella 1 week ago but is awaiting results.   She denies headache, vision changes, neck pain, back pain, abdominal

## 2023-04-18 NOTE — ED NOTES
Dr Geoffrey Delgado connected with Dr Ilana Salazar over phone      Estefany Cornejo RN  04/17/23 2129

## 2023-04-18 NOTE — H&P
ibuprofen   800mg , PRN    Historical Provider, MD   acetaminophen (TYLENOL) 500 MG tablet Take 1,000 mg by mouth as needed    Historical Provider, MD   Bacillus Coagulans-Inulin (PROBIOTIC) 1-250 BILLION-MG CAPS Take 1 capsule by mouth daily  Patient not taking: Reported on 2023   Ari Arndt MD   sertraline (ZOLOFT) 100 MG tablet Take 1/2 tablet (50 mg) by mouth daily for 5 days, then increase to 1 full tablet by mouth daily thereafter (100mg)  Patient not taking: Reported on 2023   Ari Arndt MD   traZODone (DESYREL) 50 MG tablet Take 1 tablet by mouth nightly as needed for Sleep  Patient not taking: Reported on 2023   Ari Arndt MD        Allergies:     Bee venom, Doxycycline, Ziprasidone hcl, Depakote er [divalproex sodium er], Toradol [ketorolac tromethamine], Azithromycin, Midazolam, and Morphine and related    Physical Exam:   /79   Pulse 67   Temp 98.8 °F (37.1 °C) (Oral)   Resp 18   Ht 5' 4\" (1.626 m)   Wt 200 lb (90.7 kg)   LMP 2023   SpO2 97%   BMI 34.33 kg/m²   Temp (24hrs), Av.5 °F (36.9 °C), Min:98.2 °F (36.8 °C), Max:98.8 °F (37.1 °C)    No results for input(s): POCGLU in the last 72 hours.   No intake or output data in the 24 hours ending 23 1156    General Appearance:  alert, well appearing, and in no acute distress  Mental status: oriented to person, place, and time  Head:  normocephalic, atraumatic  Eye: no icterus, redness, pupils equal and reactive, extraocular eye movements intact, conjunctiva clear  Ear: normal external ear, no discharge, hearing intact  Nose:  no drainage noted  Mouth: mucous membranes moist  Neck: supple, no carotid bruits, thyroid not palpable  Lungs: Bilateral equal air entry, clear to ausculation, no wheezing, rales or rhonchi, normal effort  Cardiovascular: normal rate, regular rhythm, no murmur, gallop, rub  Abdomen: Soft, nontender, nondistended, normal bowel sounds, no hepatomegaly or

## 2023-04-18 NOTE — PLAN OF CARE
Problem: Respiratory - Adult  Goal: Achieves optimal ventilation and oxygenation  Flowsheets (Taken 4/18/2023 1246)  Achieves optimal ventilation and oxygenation:   Assess for changes in respiratory status   Assess and instruct to report shortness of breath or any respiratory difficulty   Respiratory therapy support as indicated     Problem: Respiratory - Adult  Goal: Achieves optimal ventilation and oxygenation  Flowsheets (Taken 4/18/2023 1246)  Achieves optimal ventilation and oxygenation:   Assess for changes in respiratory status   Assess and instruct to report shortness of breath or any respiratory difficulty   Respiratory therapy support as indicated     Problem: Respiratory - Adult  Goal: Achieves optimal ventilation and oxygenation  Flowsheets (Taken 4/18/2023 1246)  Achieves optimal ventilation and oxygenation:   Assess for changes in respiratory status   Assess and instruct to report shortness of breath or any respiratory difficulty   Respiratory therapy support as indicated

## 2023-04-18 NOTE — RT PROTOCOL NOTE
RT Inhaler-Nebulizer Bronchodilator Protocol Note    There is a bronchodilator order in the chart from a provider indicating to follow the RT Bronchodilator Protocol and there is an Initiate RT Inhaler-Nebulizer Bronchodilator Protocol order as well (see protocol at bottom of note). CXR Findings:  XR CHEST PORTABLE    Result Date: 4/17/2023  No acute abnormality. The findings from the last RT Protocol Assessment were as follows:   History Pulmonary Disease: None or smoker <15 pack years  Respiratory Pattern: Dyspnea on exertion or RR 21-25 bpm  Breath Sounds: Slightly diminished and/or crackles  Cough: Strong, productive  Indication for Bronchodilator Therapy: On home bronchodilators  Bronchodilator Assessment Score: 5    Aerosolized bronchodilator medication orders have been revised according to the RT Inhaler-Nebulizer Bronchodilator Protocol below. Respiratory Therapist to perform RT Therapy Protocol Assessment initially then follow the protocol. Repeat RT Therapy Protocol Assessment PRN for score 0-3 or on second treatment, BID, and PRN for scores above 3. No Indications - adjust the frequency to every 6 hours PRN wheezing or bronchospasm, if no treatments needed after 48 hours then discontinue using Per Protocol order mode. If indication present, adjust the RT bronchodilator orders based on the Bronchodilator Assessment Score as indicated below. Use Inhaler orders unless patient has one or more of the following: on home nebulizer, not able to hold breath for 10 seconds, is not alert and oriented, cannot activate and use MDI correctly, or respiratory rate 25 breaths per minute or more, then use the equivalent nebulizer order(s) with same Frequency and PRN reasons based on the score. If a patient is on this medication at home then do not decrease Frequency below that used at home.     0-3 - enter or revise RT bronchodilator order(s) to equivalent RT Bronchodilator order with Frequency of

## 2023-04-18 NOTE — CONSULTS
[facility]  Initial Consult Note  Date: 4/18/2023    Hospital day :1     Impression:   History of Legionella and mycoplasma pneumonia and currently no evidence of pneumonia  Bronchitis with possible reactive airway disease  History of lupus nephritis  History of chronic dyspnea  Obesity    Recommendations   There is no clinical evidence to suggest pneumonia and her clinical presentation and symptoms seem to suggest bronchitis  Whether this is viral or bacterial is not clear to me but the patient has been adequately treated for mycoplasma/Legionella infection with the aforementioned mentioned levofloxacin/rocephin. There is no role for broad-spectrum/systemic antibiotics at this point time and I will discontinue the Zosyn and vancomycin  I was considering giving her oral doxycycline but she has an allergy  Infectious disease wise the patient is okay for discharge and will need outpatient follow-up with the pulmonary team    Chief complaint/reason for consultation:   Pneumonia    History of Present Illness:   José Luis Chowdary is a 28y.o.-year-old female who was initially admitted on 4/17/2023. Virgilio Macdonald has a history of hypertension, depression, obesity, dyspnea, opioid use disorder. She presented to the emergency department with reports that she has had at least a 2-month history of difficulty in breathing. She reports being hospitalized and being diagnosed with Legionella pneumonia, mycoplasma pneumonia and has been treated with multiple antibiotics including intravenous antibiotic therapy with Rocephin at 1 point as well as oral levofloxacin subsequently.   The patient states that she had a history of pneumonia occasionally in the past.    The patient states that she had difficulty breathing for quite a few months and at times has had episodes of coughing with blood production, purulent phlegm is brown-green and episodes of hypoxia with decreased O2 sats with  Over the last week she had more

## 2023-04-18 NOTE — PROGRESS NOTES
[address] [facility] Pharmacokinetic Monitoring Service - Vancomycin     Alcira Duncan is a 28 y.o. female starting on vancomycin therapy for pneumonia. Pharmacy consulted by Gualberto Barboza for monitoring and adjustment. Target Concentration: Goal AUC/JEAN 400-600 mg*hr/L    Additional Antimicrobials: Zosyn    Pertinent Laboratory Values: Wt Readings from Last 1 Encounters:   04/17/23 200 lb (90.7 kg)     Temp Readings from Last 1 Encounters:   04/17/23 98.6 °F (37 °C) (Oral)     Estimated Creatinine Clearance: 120 mL/min (based on SCr of 0.76 mg/dL). Recent Labs     04/17/23 2032   CREATININE 0.76   WBC 11.8*     Procalcitonin:     Pertinent Cultures:  Culture Date Source Results   pending     MRSA Nasal Swab: not ordered. Order placed by pharmacy.     Plan:  Dosing recommendations based on Bayesian software  Start vancomycin 1500 mg every 12 hours  Anticipated AUC of 552 and trough concentration of 16.4 at steady state  Renal labs as indicated     Pharmacy will continue to monitor patient and adjust therapy as indicated    Thank you for the consult,  Brianda Brannon, [address] [facility]  4/18/2023 12:26 AM

## 2023-04-18 NOTE — PROGRESS NOTES
Occupational [address]  Occupational Therapy Not Seen Note    DATE: 2023    NAME: José Luis Chowdary  MRN: [id number]   : 1995      Patient not seen this date for Occupational Therapy due to: Other:  Pt will be transferred to different hospital - [facility].         Electronically signed by RAVINDER Valentin OTR/L on 2023 at 3:43 PM

## 2023-04-18 NOTE — ED NOTES
[facility] on inpt bypass, [facility] paged for admit to [facility], per MD request      Sully Collins, RN  04/17/23 9230

## 2023-04-18 NOTE — PROGRESS NOTES
Physical Therapy        Physical Therapy Cancel Note      DATE: 2023    NAME: Beckie Short  MRN: [id number]   : 1995      Patient not seen this date for Physical Therapy due to:     Other: Transfer to [facility] signed by Barrera Paredes PT on 2023 at 3:36 PM

## 2023-04-19 ENCOUNTER — ANESTHESIA (OUTPATIENT)
Dept: ENDOSCOPY | Age: 28
End: 2023-04-19
Payer: MEDICAID

## 2023-04-19 ENCOUNTER — ANESTHESIA EVENT (OUTPATIENT)
Dept: ENDOSCOPY | Age: 28
End: 2023-04-19
Payer: MEDICAID

## 2023-04-19 ENCOUNTER — HOSPITAL ENCOUNTER (INPATIENT)
Age: 28
LOS: 1 days | Discharge: HOME | DRG: 139 | End: 2023-04-20
Attending: INTERNAL MEDICINE | Admitting: INTERNAL MEDICINE
Payer: MEDICAID

## 2023-04-19 DIAGNOSIS — R06.02 SHORTNESS OF BREATH: ICD-10-CM

## 2023-04-19 DIAGNOSIS — R05.3 PERSISTENT COUGH: ICD-10-CM

## 2023-04-19 PROBLEM — A48.1 LEGIONELLA PNEUMONIA (HCC): Status: ACTIVE | Noted: 2023-04-19

## 2023-04-19 LAB
ANION GAP SERPL CALCULATED.3IONS-SCNC: 11 MMOL/L (ref 9–17)
BUN SERPL-MCNC: 8 MG/DL (ref 6–20)
CALCIUM SERPL-MCNC: 8.9 MG/DL (ref 8.6–10.4)
CHLORIDE SERPL-SCNC: 103 MMOL/L (ref 98–107)
CO2 SERPL-SCNC: 26 MMOL/L (ref 20–31)
CREAT SERPL-MCNC: 0.91 MG/DL (ref 0.5–0.9)
EOSINOPHILS BAL: 0 % (ref 0–1)
GFR SERPL CREATININE-BSD FRML MDRD: >60 ML/MIN/1.73M2
GLUCOSE BLD-MCNC: 116 MG/DL (ref 65–105)
GLUCOSE SERPL-MCNC: 83 MG/DL (ref 70–99)
LYMPHOCYTES, BAL: 20 % (ref 8–12)
MACROPHAGES, BAL: 7 % (ref 85–95)
MRSA, DNA, NASAL: NEGATIVE
POTASSIUM SERPL-SCNC: 3.6 MMOL/L (ref 3.7–5.3)
SEGMENTED NEUTROPHILS, BAL: 73 % (ref 0–10)
SODIUM SERPL-SCNC: 140 MMOL/L (ref 135–144)
SPECIMEN DESCRIPTION: NORMAL

## 2023-04-19 PROCEDURE — 7100000001 HC PACU RECOVERY - ADDTL 15 MIN: Performed by: INTERNAL MEDICINE

## 2023-04-19 PROCEDURE — 3700000001 HC ADD 15 MINUTES (ANESTHESIA): Performed by: INTERNAL MEDICINE

## 2023-04-19 PROCEDURE — 6360000002 HC RX W HCPCS: Performed by: NURSE ANESTHETIST, CERTIFIED REGISTERED

## 2023-04-19 PROCEDURE — 99223 1ST HOSP IP/OBS HIGH 75: CPT | Performed by: INTERNAL MEDICINE

## 2023-04-19 PROCEDURE — 2060000000 HC ICU INTERMEDIATE R&B

## 2023-04-19 PROCEDURE — 87140 CULTURE TYPE IMMUNOFLUORESC: CPT

## 2023-04-19 PROCEDURE — 6370000000 HC RX 637 (ALT 250 FOR IP): Performed by: INTERNAL MEDICINE

## 2023-04-19 PROCEDURE — 87252 VIRUS INOCULATION TISSUE: CPT

## 2023-04-19 PROCEDURE — 6360000002 HC RX W HCPCS: Performed by: INTERNAL MEDICINE

## 2023-04-19 PROCEDURE — 7100000000 HC PACU RECOVERY - FIRST 15 MIN: Performed by: INTERNAL MEDICINE

## 2023-04-19 PROCEDURE — 94640 AIRWAY INHALATION TREATMENT: CPT

## 2023-04-19 PROCEDURE — 2500000003 HC RX 250 WO HCPCS: Performed by: INTERNAL MEDICINE

## 2023-04-19 PROCEDURE — 87205 SMEAR GRAM STAIN: CPT

## 2023-04-19 PROCEDURE — 87300 AG DETECTION POLYVAL IF: CPT

## 2023-04-19 PROCEDURE — 87081 CULTURE SCREEN ONLY: CPT

## 2023-04-19 PROCEDURE — 87206 SMEAR FLUORESCENT/ACID STAI: CPT

## 2023-04-19 PROCEDURE — 87102 FUNGUS ISOLATION CULTURE: CPT

## 2023-04-19 PROCEDURE — 88112 CYTOPATH CELL ENHANCE TECH: CPT

## 2023-04-19 PROCEDURE — 6360000002 HC RX W HCPCS: Performed by: NURSE PRACTITIONER

## 2023-04-19 PROCEDURE — 88312 SPECIAL STAINS GROUP 1: CPT

## 2023-04-19 PROCEDURE — 94664 DEMO&/EVAL PT USE INHALER: CPT

## 2023-04-19 PROCEDURE — 2580000003 HC RX 258: Performed by: INTERNAL MEDICINE

## 2023-04-19 PROCEDURE — 6370000000 HC RX 637 (ALT 250 FOR IP): Performed by: NURSE PRACTITIONER

## 2023-04-19 PROCEDURE — 88305 TISSUE EXAM BY PATHOLOGIST: CPT

## 2023-04-19 PROCEDURE — 36415 COLL VENOUS BLD VENIPUNCTURE: CPT

## 2023-04-19 PROCEDURE — 3700000000 HC ANESTHESIA ATTENDED CARE: Performed by: INTERNAL MEDICINE

## 2023-04-19 PROCEDURE — 80048 BASIC METABOLIC PNL TOTAL CA: CPT

## 2023-04-19 PROCEDURE — 89051 BODY FLUID CELL COUNT: CPT

## 2023-04-19 PROCEDURE — 87070 CULTURE OTHR SPECIMN AEROBIC: CPT

## 2023-04-19 PROCEDURE — 0B9F8ZX DRAINAGE OF RIGHT LOWER LUNG LOBE, VIA NATURAL OR ARTIFICIAL OPENING ENDOSCOPIC, DIAGNOSTIC: ICD-10-PCS | Performed by: INTERNAL MEDICINE

## 2023-04-19 PROCEDURE — 94761 N-INVAS EAR/PLS OXIMETRY MLT: CPT

## 2023-04-19 PROCEDURE — 87255 GENET VIRUS ISOLATE HSV: CPT

## 2023-04-19 PROCEDURE — 3609027000 HC BRONCHOSCOPY: Performed by: INTERNAL MEDICINE

## 2023-04-19 PROCEDURE — 88313 SPECIAL STAINS GROUP 2: CPT

## 2023-04-19 PROCEDURE — 87015 SPECIMEN INFECT AGNT CONCNTJ: CPT

## 2023-04-19 PROCEDURE — 82947 ASSAY GLUCOSE BLOOD QUANT: CPT

## 2023-04-19 PROCEDURE — 87106 FUNGI IDENTIFICATION YEAST: CPT

## 2023-04-19 PROCEDURE — 2580000003 HC RX 258: Performed by: NURSE PRACTITIONER

## 2023-04-19 PROCEDURE — 87116 MYCOBACTERIA CULTURE: CPT

## 2023-04-19 PROCEDURE — 2500000003 HC RX 250 WO HCPCS: Performed by: NURSE ANESTHETIST, CERTIFIED REGISTERED

## 2023-04-19 PROCEDURE — 2709999900 HC NON-CHARGEABLE SUPPLY: Performed by: INTERNAL MEDICINE

## 2023-04-19 PROCEDURE — 2700000000 HC OXYGEN THERAPY PER DAY

## 2023-04-19 RX ORDER — ACETAMINOPHEN 650 MG/1
650 SUPPOSITORY RECTAL EVERY 6 HOURS PRN
Status: DISCONTINUED | OUTPATIENT
Start: 2023-04-19 | End: 2023-04-20 | Stop reason: HOSPADM

## 2023-04-19 RX ORDER — ALBUTEROL SULFATE 2.5 MG/3ML
2.5 SOLUTION RESPIRATORY (INHALATION) ONCE
Status: COMPLETED | OUTPATIENT
Start: 2023-04-19 | End: 2023-04-19

## 2023-04-19 RX ORDER — ALBUTEROL SULFATE 2.5 MG/3ML
2.5 SOLUTION RESPIRATORY (INHALATION) 3 TIMES DAILY
Status: DISCONTINUED | OUTPATIENT
Start: 2023-04-19 | End: 2023-04-20 | Stop reason: HOSPADM

## 2023-04-19 RX ORDER — POLYETHYLENE GLYCOL 3350 17 G/17G
17 POWDER, FOR SOLUTION ORAL DAILY PRN
Status: DISCONTINUED | OUTPATIENT
Start: 2023-04-19 | End: 2023-04-20 | Stop reason: HOSPADM

## 2023-04-19 RX ORDER — NADOLOL 20 MG/1
40 TABLET ORAL DAILY
Status: DISCONTINUED | OUTPATIENT
Start: 2023-04-19 | End: 2023-04-20 | Stop reason: HOSPADM

## 2023-04-19 RX ORDER — BUPRENORPHINE HYDROCHLORIDE 8 MG/1
22 TABLET SUBLINGUAL DAILY
Status: DISCONTINUED | OUTPATIENT
Start: 2023-04-19 | End: 2023-04-19

## 2023-04-19 RX ORDER — BUDESONIDE AND FORMOTEROL FUMARATE DIHYDRATE 160; 4.5 UG/1; UG/1
2 AEROSOL RESPIRATORY (INHALATION) 2 TIMES DAILY
Status: DISCONTINUED | OUTPATIENT
Start: 2023-04-19 | End: 2023-04-20 | Stop reason: HOSPADM

## 2023-04-19 RX ORDER — METOCLOPRAMIDE HYDROCHLORIDE 5 MG/ML
10 INJECTION INTRAMUSCULAR; INTRAVENOUS
Status: DISCONTINUED | OUTPATIENT
Start: 2023-04-19 | End: 2023-04-19 | Stop reason: HOSPADM

## 2023-04-19 RX ORDER — ACETAMINOPHEN 325 MG/1
650 TABLET ORAL EVERY 6 HOURS PRN
Status: DISCONTINUED | OUTPATIENT
Start: 2023-04-19 | End: 2023-04-20 | Stop reason: HOSPADM

## 2023-04-19 RX ORDER — PREGABALIN 75 MG/1
75 CAPSULE ORAL DAILY
Status: DISCONTINUED | OUTPATIENT
Start: 2023-04-19 | End: 2023-04-20 | Stop reason: HOSPADM

## 2023-04-19 RX ORDER — ONDANSETRON 2 MG/ML
4 INJECTION INTRAMUSCULAR; INTRAVENOUS
Status: DISCONTINUED | OUTPATIENT
Start: 2023-04-19 | End: 2023-04-19 | Stop reason: HOSPADM

## 2023-04-19 RX ORDER — SODIUM CHLORIDE 0.9 % (FLUSH) 0.9 %
5-40 SYRINGE (ML) INJECTION EVERY 12 HOURS SCHEDULED
Status: DISCONTINUED | OUTPATIENT
Start: 2023-04-19 | End: 2023-04-19 | Stop reason: HOSPADM

## 2023-04-19 RX ORDER — ALBUTEROL SULFATE 2.5 MG/3ML
2.5 SOLUTION RESPIRATORY (INHALATION) EVERY 4 HOURS PRN
Status: DISCONTINUED | OUTPATIENT
Start: 2023-04-19 | End: 2023-04-20 | Stop reason: HOSPADM

## 2023-04-19 RX ORDER — SODIUM CHLORIDE 0.9 % (FLUSH) 0.9 %
5-40 SYRINGE (ML) INJECTION EVERY 12 HOURS SCHEDULED
Status: DISCONTINUED | OUTPATIENT
Start: 2023-04-19 | End: 2023-04-20 | Stop reason: HOSPADM

## 2023-04-19 RX ORDER — MIDAZOLAM HYDROCHLORIDE 1 MG/ML
INJECTION INTRAMUSCULAR; INTRAVENOUS PRN
Status: DISCONTINUED | OUTPATIENT
Start: 2023-04-19 | End: 2023-04-19 | Stop reason: SDUPTHER

## 2023-04-19 RX ORDER — BUPRENORPHINE AND NALOXONE 4; 1 MG/1; MG/1
5.5 FILM, SOLUBLE BUCCAL; SUBLINGUAL DAILY
Status: DISCONTINUED | OUTPATIENT
Start: 2023-04-19 | End: 2023-04-20 | Stop reason: HOSPADM

## 2023-04-19 RX ORDER — LIDOCAINE HYDROCHLORIDE 20 MG/ML
INJECTION, SOLUTION EPIDURAL; INFILTRATION; INTRACAUDAL; PERINEURAL PRN
Status: DISCONTINUED | OUTPATIENT
Start: 2023-04-19 | End: 2023-04-19 | Stop reason: SDUPTHER

## 2023-04-19 RX ORDER — SODIUM CHLORIDE 9 MG/ML
INJECTION, SOLUTION INTRAVENOUS PRN
Status: DISCONTINUED | OUTPATIENT
Start: 2023-04-19 | End: 2023-04-19 | Stop reason: HOSPADM

## 2023-04-19 RX ORDER — CEFUROXIME AXETIL 250 MG/1
500 TABLET ORAL EVERY 12 HOURS SCHEDULED
Status: DISCONTINUED | OUTPATIENT
Start: 2023-04-19 | End: 2023-04-20

## 2023-04-19 RX ORDER — SERTRALINE HYDROCHLORIDE 100 MG/1
100 TABLET, FILM COATED ORAL DAILY
Status: DISCONTINUED | OUTPATIENT
Start: 2023-04-19 | End: 2023-04-20 | Stop reason: HOSPADM

## 2023-04-19 RX ORDER — ONDANSETRON 4 MG/1
4 TABLET, ORALLY DISINTEGRATING ORAL EVERY 8 HOURS PRN
Status: DISCONTINUED | OUTPATIENT
Start: 2023-04-19 | End: 2023-04-20 | Stop reason: HOSPADM

## 2023-04-19 RX ORDER — DEXAMETHASONE SODIUM PHOSPHATE 4 MG/ML
INJECTION, SOLUTION INTRA-ARTICULAR; INTRALESIONAL; INTRAMUSCULAR; INTRAVENOUS; SOFT TISSUE PRN
Status: DISCONTINUED | OUTPATIENT
Start: 2023-04-19 | End: 2023-04-19 | Stop reason: SDUPTHER

## 2023-04-19 RX ORDER — LIDOCAINE HYDROCHLORIDE 10 MG/ML
INJECTION, SOLUTION EPIDURAL; INFILTRATION; INTRACAUDAL; PERINEURAL PRN
Status: DISCONTINUED | OUTPATIENT
Start: 2023-04-19 | End: 2023-04-19 | Stop reason: ALTCHOICE

## 2023-04-19 RX ORDER — FENTANYL CITRATE 50 UG/ML
INJECTION, SOLUTION INTRAMUSCULAR; INTRAVENOUS PRN
Status: DISCONTINUED | OUTPATIENT
Start: 2023-04-19 | End: 2023-04-19 | Stop reason: SDUPTHER

## 2023-04-19 RX ORDER — SODIUM CHLORIDE 9 MG/ML
INJECTION, SOLUTION INTRAVENOUS PRN
Status: DISCONTINUED | OUTPATIENT
Start: 2023-04-19 | End: 2023-04-20 | Stop reason: HOSPADM

## 2023-04-19 RX ORDER — TIZANIDINE 4 MG/1
4 TABLET ORAL NIGHTLY PRN
Status: DISCONTINUED | OUTPATIENT
Start: 2023-04-19 | End: 2023-04-20 | Stop reason: HOSPADM

## 2023-04-19 RX ORDER — LIDOCAINE HYDROCHLORIDE 20 MG/ML
SOLUTION OROPHARYNGEAL PRN
Status: DISCONTINUED | OUTPATIENT
Start: 2023-04-19 | End: 2023-04-19 | Stop reason: ALTCHOICE

## 2023-04-19 RX ORDER — SODIUM CHLORIDE 0.9 % (FLUSH) 0.9 %
5-40 SYRINGE (ML) INJECTION PRN
Status: DISCONTINUED | OUTPATIENT
Start: 2023-04-19 | End: 2023-04-19 | Stop reason: HOSPADM

## 2023-04-19 RX ORDER — DEXTROMETHORPHAN POLISTIREX 30 MG/5ML
60 SUSPENSION ORAL EVERY 12 HOURS SCHEDULED
Status: DISCONTINUED | OUTPATIENT
Start: 2023-04-19 | End: 2023-04-20 | Stop reason: HOSPADM

## 2023-04-19 RX ORDER — TRAZODONE HYDROCHLORIDE 50 MG/1
50 TABLET ORAL NIGHTLY PRN
Status: DISCONTINUED | OUTPATIENT
Start: 2023-04-19 | End: 2023-04-20 | Stop reason: HOSPADM

## 2023-04-19 RX ORDER — FENTANYL CITRATE 0.05 MG/ML
25 INJECTION, SOLUTION INTRAMUSCULAR; INTRAVENOUS EVERY 5 MIN PRN
Status: DISCONTINUED | OUTPATIENT
Start: 2023-04-19 | End: 2023-04-19 | Stop reason: HOSPADM

## 2023-04-19 RX ORDER — SODIUM CHLORIDE 9 MG/ML
INJECTION, SOLUTION INTRAVENOUS CONTINUOUS
Status: DISCONTINUED | OUTPATIENT
Start: 2023-04-19 | End: 2023-04-19

## 2023-04-19 RX ORDER — GUAIFENESIN DEXTROMETHORPHAN HYDROBROMIDE ORAL SOLUTION 10; 100 MG/5ML; MG/5ML
5 SOLUTION ORAL EVERY 4 HOURS PRN
Status: DISCONTINUED | OUTPATIENT
Start: 2023-04-19 | End: 2023-04-19

## 2023-04-19 RX ORDER — ALBUTEROL SULFATE 2.5 MG/3ML
2.5 SOLUTION RESPIRATORY (INHALATION) 2 TIMES DAILY
Status: DISCONTINUED | OUTPATIENT
Start: 2023-04-19 | End: 2023-04-19

## 2023-04-19 RX ORDER — ENOXAPARIN SODIUM 100 MG/ML
40 INJECTION SUBCUTANEOUS DAILY
Status: DISCONTINUED | OUTPATIENT
Start: 2023-04-19 | End: 2023-04-20 | Stop reason: HOSPADM

## 2023-04-19 RX ORDER — LIDOCAINE HYDROCHLORIDE 40 MG/ML
4 INJECTION, SOLUTION RETROBULBAR; TOPICAL ONCE
Status: COMPLETED | OUTPATIENT
Start: 2023-04-19 | End: 2023-04-19

## 2023-04-19 RX ORDER — DIPHENHYDRAMINE HYDROCHLORIDE 50 MG/ML
12.5 INJECTION INTRAMUSCULAR; INTRAVENOUS
Status: DISCONTINUED | OUTPATIENT
Start: 2023-04-19 | End: 2023-04-19 | Stop reason: HOSPADM

## 2023-04-19 RX ORDER — LEVOTHYROXINE SODIUM 0.03 MG/1
25 TABLET ORAL DAILY
Status: DISCONTINUED | OUTPATIENT
Start: 2023-04-19 | End: 2023-04-20 | Stop reason: HOSPADM

## 2023-04-19 RX ORDER — BUPRENORPHINE AND NALOXONE 8; 2 MG/1; MG/1
2.75 FILM, SOLUBLE BUCCAL; SUBLINGUAL DAILY
COMMUNITY

## 2023-04-19 RX ORDER — ONDANSETRON 2 MG/ML
INJECTION INTRAMUSCULAR; INTRAVENOUS PRN
Status: DISCONTINUED | OUTPATIENT
Start: 2023-04-19 | End: 2023-04-19 | Stop reason: SDUPTHER

## 2023-04-19 RX ORDER — PROPOFOL 10 MG/ML
INJECTION, EMULSION INTRAVENOUS PRN
Status: DISCONTINUED | OUTPATIENT
Start: 2023-04-19 | End: 2023-04-19 | Stop reason: SDUPTHER

## 2023-04-19 RX ORDER — SODIUM CHLORIDE 0.9 % (FLUSH) 0.9 %
5-40 SYRINGE (ML) INJECTION PRN
Status: DISCONTINUED | OUTPATIENT
Start: 2023-04-19 | End: 2023-04-20 | Stop reason: HOSPADM

## 2023-04-19 RX ORDER — ONDANSETRON 2 MG/ML
4 INJECTION INTRAMUSCULAR; INTRAVENOUS EVERY 6 HOURS PRN
Status: DISCONTINUED | OUTPATIENT
Start: 2023-04-19 | End: 2023-04-20 | Stop reason: HOSPADM

## 2023-04-19 RX ORDER — BENZONATATE 200 MG/1
200 CAPSULE ORAL 3 TIMES DAILY
Status: DISCONTINUED | OUTPATIENT
Start: 2023-04-19 | End: 2023-04-20 | Stop reason: HOSPADM

## 2023-04-19 RX ADMIN — ONDANSETRON 4 MG: 2 INJECTION INTRAMUSCULAR; INTRAVENOUS at 11:51

## 2023-04-19 RX ADMIN — SODIUM CHLORIDE, PRESERVATIVE FREE 10 ML: 5 INJECTION INTRAVENOUS at 21:59

## 2023-04-19 RX ADMIN — BUDESONIDE AND FORMOTEROL FUMARATE DIHYDRATE 2 PUFF: 160; 4.5 AEROSOL RESPIRATORY (INHALATION) at 19:33

## 2023-04-19 RX ADMIN — SODIUM CHLORIDE: 9 INJECTION, SOLUTION INTRAVENOUS at 10:57

## 2023-04-19 RX ADMIN — PROPOFOL 40 MG: 10 INJECTION, EMULSION INTRAVENOUS at 11:51

## 2023-04-19 RX ADMIN — ALBUTEROL SULFATE 2.5 MG: 2.5 SOLUTION RESPIRATORY (INHALATION) at 08:10

## 2023-04-19 RX ADMIN — NADOLOL 40 MG: 20 TABLET ORAL at 09:24

## 2023-04-19 RX ADMIN — LIDOCAINE HYDROCHLORIDE 80 MG: 20 INJECTION, SOLUTION EPIDURAL; INFILTRATION; INTRACAUDAL; PERINEURAL at 11:51

## 2023-04-19 RX ADMIN — BUDESONIDE AND FORMOTEROL FUMARATE DIHYDRATE 2 PUFF: 160; 4.5 AEROSOL RESPIRATORY (INHALATION) at 08:10

## 2023-04-19 RX ADMIN — CEFUROXIME AXETIL 500 MG: 250 TABLET ORAL at 21:54

## 2023-04-19 RX ADMIN — MIDAZOLAM 2 MG: 1 INJECTION INTRAMUSCULAR; INTRAVENOUS at 11:51

## 2023-04-19 RX ADMIN — SODIUM CHLORIDE: 9 INJECTION, SOLUTION INTRAVENOUS at 14:48

## 2023-04-19 RX ADMIN — ALBUTEROL SULFATE 2.5 MG: 2.5 SOLUTION RESPIRATORY (INHALATION) at 05:25

## 2023-04-19 RX ADMIN — LIDOCAINE HYDROCHLORIDE 4 ML: 40 INJECTION, SOLUTION RETROBULBAR; TOPICAL at 11:01

## 2023-04-19 RX ADMIN — ACETAMINOPHEN 650 MG: 325 TABLET ORAL at 21:59

## 2023-04-19 RX ADMIN — FENTANYL CITRATE 200 MCG: 50 INJECTION, SOLUTION INTRAMUSCULAR; INTRAVENOUS at 11:51

## 2023-04-19 RX ADMIN — ALBUTEROL SULFATE 2.5 MG: 2.5 SOLUTION RESPIRATORY (INHALATION) at 19:26

## 2023-04-19 RX ADMIN — ALBUTEROL SULFATE 2.5 MG: 2.5 SOLUTION RESPIRATORY (INHALATION) at 11:01

## 2023-04-19 RX ADMIN — BUPRENORPHINE AND NALOXONE 5.5 FILM: 4; 1 FILM BUCCAL; SUBLINGUAL at 09:24

## 2023-04-19 RX ADMIN — BENZONATATE 200 MG: 200 CAPSULE ORAL at 21:54

## 2023-04-19 RX ADMIN — TIZANIDINE 4 MG: 4 TABLET ORAL at 21:59

## 2023-04-19 RX ADMIN — SERTRALINE HYDROCHLORIDE 100 MG: 100 TABLET ORAL at 08:49

## 2023-04-19 RX ADMIN — Medication 60 MG: at 21:54

## 2023-04-19 RX ADMIN — BENZONATATE 200 MG: 200 CAPSULE ORAL at 14:46

## 2023-04-19 RX ADMIN — DEXAMETHASONE SODIUM PHOSPHATE 4 MG: 4 INJECTION, SOLUTION INTRAMUSCULAR; INTRAVENOUS at 11:51

## 2023-04-19 RX ADMIN — SODIUM CHLORIDE, PRESERVATIVE FREE 10 ML: 5 INJECTION INTRAVENOUS at 08:50

## 2023-04-19 RX ADMIN — ACETAMINOPHEN 650 MG: 325 TABLET ORAL at 08:57

## 2023-04-19 RX ADMIN — PREGABALIN 75 MG: 75 CAPSULE ORAL at 08:49

## 2023-04-19 ASSESSMENT — PAIN DESCRIPTION - DESCRIPTORS
DESCRIPTORS: ACHING
DESCRIPTORS: ACHING
DESCRIPTORS: ACHING;DISCOMFORT

## 2023-04-19 ASSESSMENT — PAIN SCALES - GENERAL
PAINLEVEL_OUTOF10: 0
PAINLEVEL_OUTOF10: 5
PAINLEVEL_OUTOF10: 5

## 2023-04-19 ASSESSMENT — PAIN DESCRIPTION - PAIN TYPE: TYPE: CHRONIC PAIN

## 2023-04-19 ASSESSMENT — ENCOUNTER SYMPTOMS
SHORTNESS OF BREATH: 1
ALLERGIC/IMMUNOLOGIC NEGATIVE: 1
GASTROINTESTINAL NEGATIVE: 1
EYES NEGATIVE: 1
COUGH: 1

## 2023-04-19 ASSESSMENT — PAIN - FUNCTIONAL ASSESSMENT
PAIN_FUNCTIONAL_ASSESSMENT: ACTIVITIES ARE NOT PREVENTED
PAIN_FUNCTIONAL_ASSESSMENT: 0-10

## 2023-04-19 ASSESSMENT — PAIN DESCRIPTION - LOCATION
LOCATION: GENERALIZED
LOCATION: HEAD

## 2023-04-19 ASSESSMENT — PAIN DESCRIPTION - ONSET: ONSET: ON-GOING

## 2023-04-19 ASSESSMENT — PAIN DESCRIPTION - FREQUENCY: FREQUENCY: CONTINUOUS

## 2023-04-19 NOTE — PLAN OF CARE
Problem: Discharge Planning  Goal: Discharge to home or other facility with appropriate resources  Outcome: Progressing   Patient actively participates in ADLs and decision making regarding plan of care. Problem: Pain  Goal: Verbalizes/displays adequate comfort level or baseline comfort level  Outcome: Progressing   No new signs/symptoms of pain noted, pain rating < 3 on scale 0-10, pain controlled with medication/repositioning.    Problem: Respiratory - Adult  Goal: Achieves optimal ventilation and oxygenation  4/19/2023 0010 by Wilbur Mejia RN  Outcome: Progressing  4/18/2023 1246 by Michael Shannon RCP  Flowsheets (Taken 4/18/2023 1246)  Achieves optimal ventilation and oxygenation:   Assess for changes in respiratory status   Assess and instruct to report shortness of breath or any respiratory difficulty   Respiratory therapy support as indicated

## 2023-04-19 NOTE — CONSULTS
08:32 PM       BMP   Lab Results   Component Value Date/Time     04/19/2023 05:06 AM    K 3.6 04/19/2023 05:06 AM     04/19/2023 05:06 AM    CO2 26 04/19/2023 05:06 AM    BUN 8 04/19/2023 05:06 AM    CREATININE 0.91 04/19/2023 05:06 AM    GLUCOSE 83 04/19/2023 05:06 AM    CALCIUM 8.9 04/19/2023 05:06 AM       LFTS  Lab Results   Component Value Date/Time    ALKPHOS 69 04/17/2023 08:32 PM    ALT 18 04/17/2023 08:32 PM    AST 13 04/17/2023 08:32 PM    PROT 7.7 04/17/2023 08:32 PM    BILITOT 0.4 04/17/2023 08:32 PM    LABALBU 4.6 04/17/2023 08:32 PM       INR  No results for input(s): PROTIME, INR in the last 72 hours. APTT  No results for input(s): APTT in the last 72 hours. Lactic Acid  No results found for: LACTA     PRO-BNP   No results for input(s): PROBNP in the last 72 hours.         ABGs: No results found for: PHART, PO2ART, IPA8KIL    No results found for: IFIO2, MODE, SETTIDVOL, SETPEEP      Impression    Persistent cough with blood-tinged sputum/hemoptysis  Recent diagnosis of mycoplasma and legionnaires pneumonia current CAT scan is clear  Hypoxemia, mild patient normally not on oxygen currently on 2 L nasal cannula  History of asthma but no PFTs  History of opioid use disorder  Hypothyroid  Rare tobacco use    Plan:      Plan for bronch with BAL-risks and benefits explained to patient and she is agreeable  Continue IV fluid  Placed on albuterol 3 times a day  Continue Advair (Symbicort substituted here)  Outpatient pulmonary function testing, if normal would proceed with methacholine challenge  Depending on findings of bronchoscopy, may need additional longer-term antibiotic therapy    Addendum: Did get Dr. Agrawal Dys records, he had put her on an additional week of Levaquin when he is seen in the office

## 2023-04-19 NOTE — ANESTHESIA POSTPROCEDURE EVALUATION
POST- ANESTHESIA EVALUATION       Pt Name: Heidi Faust  MRN: [id number]  YOB: 1995  Date of evaluation: 4/19/2023  Time:  12:59 PM      /72   Pulse 78   Temp 98.2 °F (36.8 °C)   Resp 15   LMP 04/03/2023 Comment: HCG negative on 4/17/23 at [facility] before transfer to [facility]  SpO2 94%      Consciousness Level  Awake  Cardiopulmonary Status  Stable  Pain Adequately Treated YES  Nausea / Vomiting  NO  Adequate Hydration  YES  Anesthesia Related Complications NONE      Electronically signed by Kenya Chung MD on 4/19/2023 at 12:59 PM       Department of Anesthesiology  Postprocedure Note    Patient: Heidi Faust  MRN: [id number]  YOB: 1995  Date of evaluation: 4/19/2023      Procedure Summary     Date: 04/19/23 Room / Location: [place] [address] / [facility]    Anesthesia Start: 1148 Anesthesia Stop: 1211    Procedure: BRONCHOSCOPY WITH BRONCHOALVEOLAR LAVAGE (Bronchus) Diagnosis:       Persistent cough      Shortness of breath      (Persistent cough [R05.3])      (Shortness of breath [R06.02])    Surgeons: Cammie Ryan MD Responsible Provider: Kenya Chung MD    Anesthesia Type: General ASA Status: 2          Anesthesia Type: General    Annabella Phase I: Annabella Score: 10    Annabella Phase II:        Anesthesia Post Evaluation

## 2023-04-19 NOTE — PLAN OF CARE
Problem: Discharge Planning  Goal: Discharge to home or other facility with appropriate resources  Outcome: Progressing  Flowsheets (Taken 4/19/2023 0356)  Discharge to home or other facility with appropriate resources:   Identify barriers to discharge with patient and caregiver   Arrange for needed discharge resources and transportation as appropriate   Identify discharge learning needs (meds, wound care, etc)   Refer to discharge planning if patient needs post-hospital services based on physician order or complex needs related to functional status, cognitive ability or social support system     Problem: Safety - Adult  Goal: Free from fall injury  Outcome: Progressing  Flowsheets (Taken 4/19/2023 0356)  Free From Fall Injury:   Instruct family/caregiver on patient safety   Based on caregiver fall risk screen, instruct family/caregiver to ask for assistance with transferring infant if caregiver noted to have fall risk factors     Problem: Chronic Conditions and Co-morbidities  Goal: Patient's chronic conditions and co-morbidity symptoms are monitored and maintained or improved  Outcome: Progressing  Flowsheets (Taken 4/19/2023 0356)  Care Plan - Patient's Chronic Conditions and Co-Morbidity Symptoms are Monitored and Maintained or Improved:   Monitor and assess patient's chronic conditions and comorbid symptoms for stability, deterioration, or improvement   Collaborate with multidisciplinary team to address chronic and comorbid conditions and prevent exacerbation or deterioration   Update acute care plan with appropriate goals if chronic or comorbid symptoms are exacerbated and prevent overall improvement and discharge     Problem: Respiratory - Adult  Goal: Achieves optimal ventilation and oxygenation  Outcome: Progressing  Flowsheets (Taken 4/19/2023 0356)  Achieves optimal ventilation and oxygenation:   Assess for changes in respiratory status   Oxygen supplementation based on oxygen saturation or arterial

## 2023-04-19 NOTE — ANESTHESIA PRE PROCEDURE
Diagnosis Date    BBB (bundle branch block)     Endometriosis     GERD (gastroesophageal reflux disease)     History of miscarriage     Migraine     MVP (mitral valve prolapse)        Past Surgical History:        Procedure Laterality Date    ABDOMINAL EXPLORATION SURGERY       SECTION      x 2    ENDOSCOPY, COLON, DIAGNOSTIC         Social History:    Social History     Tobacco Use    Smoking status: Former     Types: Cigarettes    Smokeless tobacco: Never   Substance Use Topics    Alcohol use: Not Currently                                Counseling given: Not Answered      Vital Signs (Current):   Vitals:    23 0600 23 0813 23 1055 23 1101   BP: 124/72  126/89    Pulse: 81  74 68   Resp: 18     Temp: 98.2 °F (36.8 °C)  97.1 °F (36.2 °C)    TempSrc: Oral  Infrared    SpO2: 90% 90% 96% 95%                                              BP Readings from Last 3 Encounters:   23 126/89   23 127/82   22 120/88       NPO Status: Time of last liquid consumption:                         Time of last solid consumption:                         Date of last liquid consumption: 23                        Date of last solid food consumption: 23    BMI:   Wt Readings from Last 3 Encounters:   23 200 lb (90.7 kg)   10/28/22 220 lb (99.8 kg)   22 220 lb (99.8 kg)     There is no height or weight on file to calculate BMI.    CBC:   Lab Results   Component Value Date/Time    WBC 11.8 2023 08:32 PM    RBC 5.26 2023 08:32 PM    HGB 12.7 2023 08:32 PM    HCT 40.2 2023 08:32 PM    MCV 76.4 2023 08:32 PM    RDW 16.5 2023 08:32 PM     2023 08:32 PM       CMP:   Lab Results   Component Value Date/Time     2023 05:06 AM    K 3.6 2023 05:06 AM     2023 05:06 AM    CO2 26 2023 05:06 AM    BUN 8 2023 05:06 AM    CREATININE 0.91 2023 05:06 AM    GFRAA

## 2023-04-19 NOTE — PROCEDURES
Bronchoscopy Procedure Note    Date of Operation: 4/19/2023    Pre-op Diagnosis: Dyspnea, cough    Post-op Diagnosis: Acute bronchitis    Surgeon: Elvira Rodriguez MD    Assistants: None    Anesthesia: Monitored Local Anesthesia with Sedation    Operation: Flexible fiberoptic bronchoscopy, diagnostic BAL right lower lobe      Specimen: BAL right lower lobe    Estimated Blood Loss: None      Complications: None    Indications and History:  The patient is a 28 y.o. female with history of lupus, mycoplasma legionnaires pneumonia. The risks, benefits, complications, treatment options and expected outcomes were discussed with the patient. The possibilities of reaction to medication, pulmonary aspiration, perforation of a viscus, bleeding, failure to diagnose a condition and creating a complication requiring transfusion or operation were discussed with the patient who freely signed the consent. Description of Procedure: The patient was seen in the Holding Room and the site of surgery properly noted/marked. The patient was taken to bronchoscopy, identified as Emily Gutierrez and the procedure verified as Flexible Fiberoptic Bronchoscopy. A Time Out was held and the above information confirmed. After the induction of topical nasopharyngeal anesthesia, the patient was positioned supine and the bronchoscope was passed through the right nares. The vocal cords were visualized and  1% plain lidocaine 2-3 ml was topically placed onto the cords. The cords were normal and abducted and abducted without any abnormality. The scope was then passed into the trachea. 1% plain lidocaine 2-3 ml was used topically on the ronnie. Careful inspection of the tracheal lumen was accomplished. The bronchoscope was then passed into the right mainstem bronchus.   The right upper lobe was visualized and was normal.  The right middle lobe was visualized and was normal.  Right middle lobe there was some yellowish secretions noted

## 2023-04-19 NOTE — H&P
this chart was generated using voice recognition Dragon dictation software. Although every effort was made to ensure the accuracy of this automated transcription, some errors in transcription may have occurred. Nicolasa Chavez [address]  [place], [address] [place] [address]. Phone [phone]     Attending Physician Statement  I have discussed the care of Alcira Duncan with the CNP. I have examined the patient myself and taken ros and hpi , including pertinent history and exam findings. I have reviewed the key elements of all parts of the encounter with the CNPt. I agree with the assessment, plan and orders as documented by the CNP. 26-year-old female diagnosed with mycoplasma and Legionella pneumonia about 4 weeks ago treated with Levaquin at the time but started feeling worse about 1 week ago with worsening shortness of breath, cough and chest pressure. Was started again on treatment with Levaquin but did not seem to be improving. Reports fever prior to presentation, requiring 2 L nasal cannula oxygen at presentation. Patient had seen pulmonology outpatient and bronchoscopy was discussed.   When she presented with worsening symptoms in ER, pulmonology was consulted to consider bronchoscopy-patient transferred to [facility] from [facility] ER  Is being evaluated outpatient for possible lupus  CT chest  Underwent bronchoscopy this morning  Awaiting final pulmonology recommendations      DVT prophylaxis-no biopsies were taken and bronchoscopy, starting on prophylactic Lovenox    Electronically signed by Wyatt Phelan MD

## 2023-04-20 ENCOUNTER — APPOINTMENT (OUTPATIENT)
Dept: GENERAL RADIOLOGY | Age: 28
DRG: 139 | End: 2023-04-20
Attending: INTERNAL MEDICINE
Payer: MEDICAID

## 2023-04-20 ENCOUNTER — APPOINTMENT (OUTPATIENT)
Dept: NON INVASIVE DIAGNOSTICS | Age: 28
DRG: 139 | End: 2023-04-20
Attending: INTERNAL MEDICINE
Payer: MEDICAID

## 2023-04-20 ENCOUNTER — APPOINTMENT (OUTPATIENT)
Dept: NUCLEAR MEDICINE | Age: 28
DRG: 139 | End: 2023-04-20
Attending: INTERNAL MEDICINE
Payer: MEDICAID

## 2023-04-20 VITALS
OXYGEN SATURATION: 94 % | HEART RATE: 74 BPM | TEMPERATURE: 98.3 F | SYSTOLIC BLOOD PRESSURE: 132 MMHG | RESPIRATION RATE: 18 BRPM | DIASTOLIC BLOOD PRESSURE: 94 MMHG

## 2023-04-20 LAB
ABSOLUTE EOS #: 0.3 K/UL (ref 0–0.4)
ABSOLUTE LYMPH #: 2.3 K/UL (ref 1–4.8)
ABSOLUTE MONO #: 1.2 K/UL (ref 0.1–1.3)
ANION GAP SERPL CALCULATED.3IONS-SCNC: 9 MMOL/L (ref 9–17)
BASOPHILS # BLD: 0 % (ref 0–2)
BASOPHILS ABSOLUTE: 0 K/UL (ref 0–0.2)
BUN SERPL-MCNC: 9 MG/DL (ref 6–20)
CALCIUM SERPL-MCNC: 8.8 MG/DL (ref 8.6–10.4)
CASE NUMBER:: NORMAL
CHLORIDE SERPL-SCNC: 106 MMOL/L (ref 98–107)
CO2 SERPL-SCNC: 26 MMOL/L (ref 20–31)
CREAT SERPL-MCNC: 1.01 MG/DL (ref 0.5–0.9)
EOSINOPHILS RELATIVE PERCENT: 3 % (ref 0–4)
FLUID DIFF COMMENT: NORMAL
GFR SERPL CREATININE-BSD FRML MDRD: >60 ML/MIN/1.73M2
GLUCOSE SERPL-MCNC: 99 MG/DL (ref 70–99)
HCT VFR BLD AUTO: 32.7 % (ref 36–46)
HGB BLD-MCNC: 10.6 G/DL (ref 12–16)
LV EF: 63 %
LVEF MODALITY: NORMAL
LYMPHOCYTES # BLD: 22 % (ref 24–44)
MCH RBC QN AUTO: 24.7 PG (ref 26–34)
MCHC RBC AUTO-ENTMCNC: 32.5 G/DL (ref 31–37)
MCV RBC AUTO: 75.9 FL (ref 80–100)
MICROORGANISM SPEC CULT: NORMAL
MICROORGANISM/AGENT SPEC: NORMAL
MICROORGANISM/AGENT SPEC: NORMAL
MONOCYTES # BLD: 11 % (ref 1–7)
PDW BLD-RTO: 16.3 % (ref 11.5–14.9)
PLATELET # BLD AUTO: 310 K/UL (ref 150–450)
PMV BLD AUTO: 8.9 FL (ref 6–12)
POTASSIUM SERPL-SCNC: 3.9 MMOL/L (ref 3.7–5.3)
RBC # BLD: 4.3 M/UL (ref 4–5.2)
RBC, BAL: 79 CELLS/UL
SEG NEUTROPHILS: 64 % (ref 36–66)
SEGMENTED NEUTROPHILS ABSOLUTE COUNT: 6.9 K/UL (ref 1.3–9.1)
SODIUM SERPL-SCNC: 141 MMOL/L (ref 135–144)
SPECIMEN DESCRIPTION: NORMAL
SPECIMEN TYPE: NORMAL
WBC # BLD AUTO: 10.6 K/UL (ref 3.5–11)
WBC, BAL: 96 CELLS/UL

## 2023-04-20 PROCEDURE — 93306 TTE W/DOPPLER COMPLETE: CPT

## 2023-04-20 PROCEDURE — 99239 HOSP IP/OBS DSCHRG MGMT >30: CPT | Performed by: INTERNAL MEDICINE

## 2023-04-20 PROCEDURE — 3430000000 HC RX DIAGNOSTIC RADIOPHARMACEUTICAL: Performed by: INTERNAL MEDICINE

## 2023-04-20 PROCEDURE — 6370000000 HC RX 637 (ALT 250 FOR IP): Performed by: INTERNAL MEDICINE

## 2023-04-20 PROCEDURE — 78582 LUNG VENTILAT&PERFUS IMAGING: CPT

## 2023-04-20 PROCEDURE — A9539 TC99M PENTETATE: HCPCS | Performed by: INTERNAL MEDICINE

## 2023-04-20 PROCEDURE — 71046 X-RAY EXAM CHEST 2 VIEWS: CPT

## 2023-04-20 PROCEDURE — 36415 COLL VENOUS BLD VENIPUNCTURE: CPT

## 2023-04-20 PROCEDURE — 2580000003 HC RX 258: Performed by: INTERNAL MEDICINE

## 2023-04-20 PROCEDURE — 80048 BASIC METABOLIC PNL TOTAL CA: CPT

## 2023-04-20 PROCEDURE — A9540 TC99M MAA: HCPCS | Performed by: INTERNAL MEDICINE

## 2023-04-20 PROCEDURE — 85025 COMPLETE CBC W/AUTO DIFF WBC: CPT

## 2023-04-20 RX ORDER — BUTALBITAL, ACETAMINOPHEN AND CAFFEINE 300; 40; 50 MG/1; MG/1; MG/1
1 CAPSULE ORAL ONCE
Status: COMPLETED | OUTPATIENT
Start: 2023-04-20 | End: 2023-04-20

## 2023-04-20 RX ORDER — DEXTROMETHORPHAN POLISTIREX 30 MG/5ML
60 SUSPENSION ORAL EVERY 12 HOURS SCHEDULED
Qty: 200 ML | Refills: 0 | Status: SHIPPED | OUTPATIENT
Start: 2023-04-20 | End: 2023-04-30

## 2023-04-20 RX ORDER — BENZONATATE 200 MG/1
200 CAPSULE ORAL 3 TIMES DAILY
Qty: 21 CAPSULE | Refills: 0 | Status: SHIPPED | OUTPATIENT
Start: 2023-04-20 | End: 2023-04-27

## 2023-04-20 RX ORDER — DOXYCYCLINE 100 MG/1
100 CAPSULE ORAL 2 TIMES DAILY
Status: DISCONTINUED | OUTPATIENT
Start: 2023-04-20 | End: 2023-04-20 | Stop reason: HOSPADM

## 2023-04-20 RX ORDER — SODIUM CHLORIDE 0.9 % (FLUSH) 0.9 %
10 SYRINGE (ML) INJECTION PRN
Status: DISCONTINUED | OUTPATIENT
Start: 2023-04-20 | End: 2023-04-20 | Stop reason: HOSPADM

## 2023-04-20 RX ORDER — BUDESONIDE AND FORMOTEROL FUMARATE DIHYDRATE 160; 4.5 UG/1; UG/1
2 AEROSOL RESPIRATORY (INHALATION) 2 TIMES DAILY
Qty: 10.2 G | Refills: 3 | Status: SHIPPED | OUTPATIENT
Start: 2023-04-20

## 2023-04-20 RX ORDER — DOXYCYCLINE 100 MG/1
100 CAPSULE ORAL 2 TIMES DAILY
Qty: 13 CAPSULE | Refills: 0 | Status: SHIPPED | OUTPATIENT
Start: 2023-04-20 | End: 2023-04-27

## 2023-04-20 RX ORDER — KIT FOR THE PREPARATION OF TECHNETIUM TC 99M PENTETATE 20 MG/1
40 INJECTION, POWDER, LYOPHILIZED, FOR SOLUTION INTRAVENOUS; RESPIRATORY (INHALATION)
Status: COMPLETED | OUTPATIENT
Start: 2023-04-20 | End: 2023-04-20

## 2023-04-20 RX ADMIN — BENZONATATE 200 MG: 200 CAPSULE ORAL at 08:37

## 2023-04-20 RX ADMIN — Medication 8.3 MILLICURIE: at 12:34

## 2023-04-20 RX ADMIN — Medication 60 MG: at 08:37

## 2023-04-20 RX ADMIN — BUPRENORPHINE AND NALOXONE 5.5 FILM: 4; 1 FILM BUCCAL; SUBLINGUAL at 08:38

## 2023-04-20 RX ADMIN — DOXYCYCLINE 100 MG: 100 CAPSULE ORAL at 14:54

## 2023-04-20 RX ADMIN — SODIUM CHLORIDE, PRESERVATIVE FREE 5 ML: 5 INJECTION INTRAVENOUS at 08:55

## 2023-04-20 RX ADMIN — SERTRALINE HYDROCHLORIDE 100 MG: 100 TABLET ORAL at 08:37

## 2023-04-20 RX ADMIN — SODIUM CHLORIDE, PRESERVATIVE FREE 10 ML: 5 INJECTION INTRAVENOUS at 12:37

## 2023-04-20 RX ADMIN — PREGABALIN 75 MG: 75 CAPSULE ORAL at 08:37

## 2023-04-20 RX ADMIN — NADOLOL 40 MG: 20 TABLET ORAL at 08:37

## 2023-04-20 RX ADMIN — CEFUROXIME AXETIL 500 MG: 250 TABLET ORAL at 08:37

## 2023-04-20 RX ADMIN — LEVOTHYROXINE SODIUM 25 MCG: 0.03 TABLET ORAL at 05:46

## 2023-04-20 RX ADMIN — SODIUM CHLORIDE, PRESERVATIVE FREE 10 ML: 5 INJECTION INTRAVENOUS at 12:34

## 2023-04-20 RX ADMIN — KIT FOR THE PREPARATION OF TECHNETIUM TC 99M PENTETATE 42.6 MILLICURIE: 20 INJECTION, POWDER, LYOPHILIZED, FOR SOLUTION INTRAVENOUS; RESPIRATORY (INHALATION) at 12:12

## 2023-04-20 RX ADMIN — BENZONATATE 200 MG: 200 CAPSULE ORAL at 14:54

## 2023-04-20 RX ADMIN — BUTALBITAL, ACETAMINOPHEN AND CAFFEINE 1 CAPSULE: 300; 40; 50 CAPSULE ORAL at 15:54

## 2023-04-20 ASSESSMENT — PAIN DESCRIPTION - ORIENTATION: ORIENTATION: LEFT;MID

## 2023-04-20 ASSESSMENT — PAIN SCALES - GENERAL: PAINLEVEL_OUTOF10: 8

## 2023-04-20 ASSESSMENT — PAIN DESCRIPTION - LOCATION: LOCATION: HEAD;EYE

## 2023-04-20 ASSESSMENT — PAIN DESCRIPTION - DESCRIPTORS: DESCRIPTORS: SHARP

## 2023-04-20 NOTE — ANESTHESIA POSTPROCEDURE EVALUATION
Department of Anesthesiology  Postprocedure Note    Patient: Emerson Cook  MRN: [id number]  YOB: 1995  Date of evaluation: 4/20/2023      Procedure Summary     Date: 04/19/23 Room / Location: [address] [place] [address] 01 / [facility] ENDO    Anesthesia Start: 7220 Anesthesia Stop: 1211    Procedure: BRONCHOSCOPY WITH BRONCHOALVEOLAR LAVAGE (Bronchus) Diagnosis:       Persistent cough      Shortness of breath      (Persistent cough [R05.3])      (Shortness of breath [R06.02])    Surgeons: Mu Garcia MD Responsible Provider: Rik Burkitt, MD    Anesthesia Type: General ASA Status: 2          Anesthesia Type: General    Annabella Phase I: Annabella Score: 10    Annabella Phase II:        Anesthesia Post Evaluation    Comments: POD #1 Patient seen lying in bed. Denied any anesthesia related issues.

## 2023-04-20 NOTE — CARE COORDINATION
Case Management Assessment  Initial Evaluation    Date/Time of Evaluation: 4/20/2023 11:46 AM  Assessment Completed by: Sanju Urias RN    If patient is discharged prior to next notation, then this note serves as note for discharge by case management. Patient Name: Linda Fraser                   YOB: 1995  Diagnosis: Legionella pneumonia [facility]) [A48.1]                   Date / Time: 4/19/2023 12:36 AM    Patient Admission Status: Inpatient   Readmission Risk (Low < 19, Mod (19-27), High > 27): Readmission Risk Score: 14.5    Current PCP: SAMI Larson NP  PCP verified by CM? Yes    Chart Reviewed: Yes      History Provided by: Patient  Patient Orientation: Alert and Oriented    Patient Cognition: Alert    Hospitalization in the last 30 days (Readmission):  No    If yes, Readmission Assessment in CM Navigator will be completed. Advance Directives:      Code Status: Full Code   Patient's Primary Decision Maker is: Patient Declined (Legal Next of Kin Remains as Decision Maker)      Discharge Planning:    Patient lives with: Spouse/Significant Other Type of Home: House  Primary Care Giver: Self  Patient Support Systems include: None   Current Financial resources: Medicaid  Current community resources: None  Current services prior to admission: None            Current DME:              Type of Home Care services:  None    ADLS  Prior functional level: Independent in ADLs/IADLs  Current functional level: Independent in ADLs/IADLs    PT AM-PAC:   /24  OT AM-PAC:   /24    Family can provide assistance at DC: Yes  Would you like Case Management to discuss the discharge plan with any other family members/significant others, and if so, who?  Yes  Plans to Return to Present Housing: Yes  Other Identified Issues/Barriers to RETURNING to current housing: no  Potential Assistance needed at discharge: N/A            Potential DME:    Patient expects to discharge to: [address] [place] [address] for

## 2023-04-20 NOTE — PROGRESS NOTES
Accu check  116
BRONCHOSPASM/BRONCHOCONSTRICTION     [x]         IMPROVE AERATION/BREATH SOUNDS  [x]   ADMINISTER BRONCHODILATOR THERAPY AS APPROPRIATE  [x]   ASSESS BREATH SOUNDS  []   IMPLEMENT AEROSOL/MDI PROTOCOL  [x]   PATIENT EDUCATION AS NEEDED
CLINICAL PHARMACY NOTE: MEDS TO BEDS    Total # of Prescriptions Filled: 4   The following medications were delivered to the patient:  Benzonatate 200mg  Cough DM 74co6mo  Doxycycline Mononitrate 100mg  Symbicort 160-4.5mcg    Additional Documentation:  Delivered medications to patients room
Consulted Dr Ifeanyi Mcwilliams, With call back request  For [facility]
Discussed medication(s) with the patient and all questions fully answered. Educated on when to follow up with Pulmonology and PCP. Patient is alert and oriented. Concerns for pt follow-up completion, educated and stressed importance of following up out pt.
Patient ambulating in room during hourly round and stated she was feeling light-headed and \"loopy\". Vitals taken, see flowsheets. SpO2 was 86% RA. Patient placed on 2.5 L nasal cannula; SpO2 now 92% 2.5 L NC. Will continue to monitor.
Pt [facility] this morning. HCG Qual done there and was negative on 4/17/23.
RN contacted Dr. Giselle Dye.   patient is ordered 75 mL/hr 0.9 NS. patient states it burns. good blood return with no infiltration. turned down to 50 mL/hr -still burning. IV is patent. currently not infusing.
chloride flush  5-40 mL IntraVENous 2 times per day    buprenorphine-naloxone  5.5 Film SubLINGual Daily    albuterol  2.5 mg Nebulization TID    benzonatate  200 mg Oral TID    dextromethorphan  60 mg Oral 2 times per day    enoxaparin  40 mg SubCUTAneous Daily     Continuous Infusions:    sodium chloride       PRN Meds: tiZANidine, traZODone, sodium chloride flush, sodium chloride, ondansetron **OR** ondansetron, polyethylene glycol, acetaminophen **OR** acetaminophen, albuterol    Data:     Past Medical History:   has a past medical history of BBB (bundle branch block), Endometriosis, GERD (gastroesophageal reflux disease), History of miscarriage, Migraine, and MVP (mitral valve prolapse). Social History:   reports that she has quit smoking. Her smoking use included cigarettes. She has never used smokeless tobacco. She reports that she does not currently use alcohol. She reports that she does not currently use drugs after having used the following drugs: Other-see comments. Family History: History reviewed. No pertinent family history. Vitals:  /88   Pulse 81   Temp 97.7 °F (36.5 °C) (Axillary)   Resp 19   LMP 2023 Comment: HCG negative on 23 at [facility] before transfer to [facility]  SpO2 92%   Temp (24hrs), Av.1 °F (36.7 °C), Min:97.7 °F (36.5 °C), Max:98.3 °F (36.8 °C)    Recent Labs     23  1054   POCGLU 116*       I/O (24Hr):     Intake/Output Summary (Last 24 hours) at 2023 1211  Last data filed at 2023 1230  Gross per 24 hour   Intake 50 ml   Output --   Net 50 ml       Labs:    Lab Results   Component Value Date    WBC 10.6 2023    HGB 10.6 (L) 2023    HCT 32.7 (L) 2023    MCV 75.9 (L) 2023     2023     Lab Results   Component Value Date/Time     2023 04:52 AM    K 3.9 2023 04:52 AM     2023 04:52 AM    CO2 26 2023 04:52 AM    BUN 9 2023 04:52 AM    CREATININE 1.01
nontender, nondistended, no masses or organomegaly  Neurologic - There are no focal motor or sensory deficits  Skin - No bruising or bleeding  Extremities - No clubbing, cyanosis, edema    MEDS      budesonide-formoterol  2 puff Inhalation BID    levothyroxine  25 mcg Oral Daily    nadolol  40 mg Oral Daily    pregabalin  75 mg Oral Daily    sertraline  100 mg Oral Daily    sodium chloride flush  5-40 mL IntraVENous 2 times per day    buprenorphine-naloxone  5.5 Film SubLINGual Daily    albuterol  2.5 mg Nebulization TID    benzonatate  200 mg Oral TID    dextromethorphan  60 mg Oral 2 times per day    cefUROXime  500 mg Oral 2 times per day    enoxaparin  40 mg SubCUTAneous Daily      sodium chloride       tiZANidine, traZODone, sodium chloride flush, sodium chloride, ondansetron **OR** ondansetron, polyethylene glycol, acetaminophen **OR** acetaminophen, albuterol    LABS   CBC   Recent Labs     04/20/23  0452   WBC 10.6   HGB 10.6*   HCT 32.7*   MCV 75.9*        BMP:   Lab Results   Component Value Date/Time     04/20/2023 04:52 AM    K 3.9 04/20/2023 04:52 AM     04/20/2023 04:52 AM    CO2 26 04/20/2023 04:52 AM    BUN 9 04/20/2023 04:52 AM    LABALBU 4.6 04/17/2023 08:32 PM    CREATININE 1.01 04/20/2023 04:52 AM    CALCIUM 8.8 04/20/2023 04:52 AM    GFRAA >60 09/05/2022 07:17 AM    LABGLOM >60 04/20/2023 04:52 AM     ABGs:No results found for: PHART, PO2ART, JBD1FYD No results found for: IFIO2, MODE, SETTIDVOL, SETPEEP  Ionized Calcium:  No results found for: IONCA  Magnesium:  No results found for: MG  Phosphorus:  No results found for: PHOS     LIVER PROFILE   Recent Labs     04/17/23 2032   AST 13   ALT 18   BILITOT 0.4   ALKPHOS 69     INR No results for input(s): INR in the last 72 hours.   PTT No results found for: APTT      RADIOLOGY     (See actual reports for details)    ASSESSMENT/PLAN   Persistent cough with blood-tinged sputum/hemoptysis; status post bronchoscopy 4/19 no evidence

## 2023-04-20 NOTE — PLAN OF CARE
Problem: Discharge Planning  Goal: Discharge to home or other facility with appropriate resources  Outcome: Adequate for Discharge     Problem: Safety - Adult  Goal: Free from fall injury  Outcome: Adequate for Discharge     Problem: Chronic Conditions and Co-morbidities  Goal: Patient's chronic conditions and co-morbidity symptoms are monitored and maintained or improved  Outcome: Adequate for Discharge     Problem: Respiratory - Adult  Goal: Achieves optimal ventilation and oxygenation  Outcome: Adequate for Discharge     Problem: Skin/Tissue Integrity  Goal: Absence of new skin breakdown  Description: 1. Monitor for areas of redness and/or skin breakdown  2. Assess vascular access sites hourly  3. Every 4-6 hours minimum:  Change oxygen saturation probe site  4. Every 4-6 hours:  If on nasal continuous positive airway pressure, respiratory therapy assess nares and determine need for appliance change or resting period.   Outcome: Adequate for Discharge     Problem: Pain  Goal: Verbalizes/displays adequate comfort level or baseline comfort level  Outcome: Adequate for Discharge     Problem: ABCDS Injury Assessment  Goal: Absence of physical injury  Outcome: Adequate for Discharge

## 2023-04-20 NOTE — DISCHARGE SUMMARY
benzonatate 200 MG capsule  Commonly known as: TESSALON  Take 1 capsule by mouth 3 times daily for 7 days     budesonide-formoterol 160-4.5 MCG/ACT Aero  Commonly known as: SYMBICORT  Inhale 2 puffs into the lungs in the morning and 2 puffs in the evening.      dextromethorphan 30 MG/5ML extended release liquid  Commonly known as: DELSYM  Take 10 mLs by mouth every 12 hours for 10 days     doxycycline monohydrate 100 MG capsule  Commonly known as: MONODOX  Take 1 capsule by mouth in the morning and at bedtime for 13 doses            CONTINUE taking these medications      acetaminophen 500 MG tablet  Commonly known as: TYLENOL     albuterol (2.5 MG/3ML) 0.083% nebulizer solution  Commonly known as: PROVENTIL     buprenorphine-naloxone 8-2 MG Film SL film  Commonly known as: SUBOXONE     butalbital-acetaminophen-caffeine -40 MG per tablet  Commonly known as: FIORICET, ESGIC     cyclobenzaprine 10 MG tablet  Commonly known as: FLEXERIL     levothyroxine 25 MCG tablet  Commonly known as: SYNTHROID     nadolol 40 MG tablet  Commonly known as: CORGARD     pregabalin 75 MG capsule  Commonly known as: LYRICA     Probiotic 1-250 BILLION-MG Caps  Take 1 capsule by mouth daily            STOP taking these medications      ADVAIR HFA IN     buprenorphine 2 MG Subl SL tablet  Commonly known as: SUBUTEX     ibuprofen 800 MG tablet  Commonly known as: ADVIL;MOTRIN     levoFLOXacin 750 MG tablet  Commonly known as: LEVAQUIN     Ozempic (0.25 or 0.5 MG/DOSE) 2 MG/1.5ML Sopn  Generic drug: Semaglutide(0.25 or 0.5MG/DOS)     traZODone 50 MG tablet  Commonly known as: DESYREL            ASK your doctor about these medications      sertraline 100 MG tablet  Commonly known as: ZOLOFT  Take 1/2 tablet (50 mg) by mouth daily for 5 days, then increase to 1 full tablet by mouth daily thereafter (100mg)               Where to Get Your Medications        [facility] [address] [facility]

## 2023-04-20 NOTE — DISCHARGE INSTR - COC
Continuity of Care Form    Patient Name: Ramonita Sheppard   :  1995  MRN:  [id number]    Admit date:  2023  Discharge date:  ***    Code Status Order: Full Code   Advance Directives:   Advance Care Flowsheet Documentation       Date/Time Healthcare Directive Type of Healthcare Directive Copy in [address] Agent's Name Healthcare Agent's Phone Number    23 1101 No, patient does not have an advance directive for healthcare treatment  declined info -- -- -- -- --            Admitting Physician:  Arnie Ruffin MD  PCP: SAMI Talavera - NP    Discharging Nurse: [facility] Unit/Room#: 2125/2125-01  Discharging Unit Phone Number: ***    Emergency Contact:   Extended Emergency Contact Information  Primary Emergency Contact: Kenya Jauregui  Address[place] [address] [place] [address]  Home Phone: [phone]  Relation: Parent    Past Surgical History:  Past Surgical History:   Procedure Laterality Date    ABDOMINAL EXPLORATION SURGERY      BRONCHOSCOPY N/A 2023    BRONCHOSCOPY WITH BRONCHOALVEOLAR LAVAGE performed by Ezio Pastrana MD at [facility]      x 2    ENDOSCOPY, COLON, DIAGNOSTIC         Immunization History: There is no immunization history on file for this patient. Active Problems:  Patient Active Problem List   Diagnosis Code    Major depressive disorder, recurrent severe without psychotic features ([address] F33.2    Opioid dependence ([address] F11.20    Endometriosis N80.9    Depression with suicidal ideation F32. A, R45.851    Morbid obesity ([address] E66.01    Pneumonia due to infectious organism J18.9    Hypoxia R09.02    Bronchitis J40    Legionella pneumonia ([address] A48.1    Persistent cough R05.3       Isolation/Infection:   Isolation            No Isolation          Patient Infection Status       Infection Onset Added Last Indicated Last Indicated By Review Planned Expiration Resolved Resolved By    None active

## 2023-04-20 NOTE — PLAN OF CARE
Problem: Discharge Planning  Goal: Discharge to home or other facility with appropriate resources  Outcome: Progressing     Problem: Safety - Adult  Goal: Free from fall injury  Outcome: Progressing  Note: Patient alert and oriented. Able to make her needs known. Up in room with steady gait. Problem: Chronic Conditions and Co-morbidities  Goal: Patient's chronic conditions and co-morbidity symptoms are monitored and maintained or improved  Outcome: Progressing     Problem: Respiratory - Adult  Goal: Achieves optimal ventilation and oxygenation  Outcome: Progressing  Note: Lung sounds diminished. Denies shortness of breath. No sough noted. Meds given as ordered. Problem: Skin/Tissue Integrity  Goal: Absence of new skin breakdown  Description: 1. Monitor for areas of redness and/or skin breakdown  2. Assess vascular access sites hourly  3. Every 4-6 hours minimum:  Change oxygen saturation probe site  4. Every 4-6 hours:  If on nasal continuous positive airway pressure, respiratory therapy assess nares and determine need for appliance change or resting period. Outcome: Progressing  Note: Skin intact. Able to turn and reposition self in bed. Problem: Pain  Goal: Verbalizes/displays adequate comfort level or baseline comfort level  Outcome: Progressing  Note: Complained of a headache. Medicated with tylenol for headache with relief.        Problem: ABCDS Injury Assessment  Goal: Absence of physical injury  Outcome: Progressing

## 2023-04-21 LAB
M PNEUMO IGM SER QL IA: 2.58
MICROORGANISM SPEC CULT: ABNORMAL
MICROORGANISM/AGENT SPEC: ABNORMAL
MICROORGANISM/AGENT SPEC: ABNORMAL
SPECIMEN DESCRIPTION: ABNORMAL
SURGICAL PATHOLOGY REPORT: NORMAL

## 2023-04-23 LAB
MICROORGANISM SPEC CULT: NORMAL
SERVICE CMNT-IMP: NORMAL
SERVICE CMNT-IMP: NORMAL
SPECIMEN DESCRIPTION: NORMAL

## 2023-04-24 LAB
MICROORGANISM SPEC CULT: NORMAL
MICROORGANISM/AGENT SPEC: NORMAL
SPECIMEN DESCRIPTION: NORMAL

## 2023-04-25 LAB
MICROORGANISM SPEC CULT: NORMAL
SPECIMEN DESCRIPTION: NORMAL

## 2023-04-26 LAB
MICROORGANISM SPEC CULT: NORMAL
MICROORGANISM SPEC CULT: NORMAL
SPECIMEN DESCRIPTION: NORMAL

## 2023-05-01 LAB
MICROORGANISM SPEC CULT: NORMAL
MICROORGANISM/AGENT SPEC: NORMAL
SPECIMEN DESCRIPTION: NORMAL

## 2023-05-03 LAB — LEGIONELLA SPECIES CULTURE: NORMAL

## 2023-05-08 LAB
MICROORGANISM SPEC CULT: NORMAL
MICROORGANISM/AGENT SPEC: NORMAL
SPECIMEN DESCRIPTION: NORMAL

## 2023-05-15 LAB
MICROORGANISM SPEC CULT: NORMAL
MICROORGANISM/AGENT SPEC: NORMAL
SPECIMEN DESCRIPTION: NORMAL

## 2023-05-22 LAB
MICROORGANISM SPEC CULT: NORMAL
MICROORGANISM/AGENT SPEC: NORMAL
SPECIMEN DESCRIPTION: NORMAL

## 2023-05-30 LAB
MICROORGANISM SPEC CULT: NORMAL
MICROORGANISM/AGENT SPEC: NORMAL
SPECIMEN DESCRIPTION: NORMAL

## 2023-06-05 LAB
MICROORGANISM SPEC CULT: NORMAL
MICROORGANISM/AGENT SPEC: NORMAL
SPECIMEN DESCRIPTION: NORMAL

## 2024-08-14 ENCOUNTER — HOSPITAL ENCOUNTER (EMERGENCY)
Age: 29
Discharge: HOME | End: 2024-08-14
Attending: EMERGENCY MEDICINE
Payer: MEDICAID

## 2024-08-14 ENCOUNTER — APPOINTMENT (OUTPATIENT)
Dept: CT IMAGING | Age: 29
End: 2024-08-14
Payer: MEDICAID

## 2024-08-14 VITALS
TEMPERATURE: 98.6 F | SYSTOLIC BLOOD PRESSURE: 126 MMHG | HEIGHT: 64 IN | OXYGEN SATURATION: 97 % | DIASTOLIC BLOOD PRESSURE: 81 MMHG | HEART RATE: 96 BPM | RESPIRATION RATE: 20 BRPM | BODY MASS INDEX: 38.07 KG/M2 | WEIGHT: 223 LBS

## 2024-08-14 DIAGNOSIS — R10.30 LOWER ABDOMINAL PAIN: Primary | ICD-10-CM

## 2024-08-14 DIAGNOSIS — K59.00 CONSTIPATION, UNSPECIFIED CONSTIPATION TYPE: ICD-10-CM

## 2024-08-14 DIAGNOSIS — K27.9 PEPTIC ULCER: ICD-10-CM

## 2024-08-14 LAB
ALBUMIN SERPL-MCNC: 4 G/DL (ref 3.5–5.2)
ALBUMIN/GLOB SERPL: 1.7 {RATIO} (ref 1–2.5)
ALP SERPL-CCNC: 65 U/L (ref 35–104)
ALT SERPL-CCNC: 16 U/L (ref 5–33)
ANION GAP SERPL CALCULATED.3IONS-SCNC: 9 MMOL/L (ref 9–17)
AST SERPL-CCNC: 14 U/L
BASOPHILS # BLD: 0 K/UL (ref 0–0.2)
BASOPHILS NFR BLD: 0 % (ref 0–2)
BILIRUB SERPL-MCNC: 0.2 MG/DL (ref 0.3–1.2)
BILIRUB UR QL STRIP: NEGATIVE
BUN SERPL-MCNC: 10 MG/DL (ref 6–20)
CALCIUM SERPL-MCNC: 8.6 MG/DL (ref 8.6–10.4)
CHLORIDE SERPL-SCNC: 101 MMOL/L (ref 98–107)
CLARITY UR: CLEAR
CO2 SERPL-SCNC: 29 MMOL/L (ref 20–31)
COLOR UR: YELLOW
COMMENT: NORMAL
CREAT SERPL-MCNC: 0.7 MG/DL (ref 0.5–0.9)
EOSINOPHIL # BLD: 0.34 K/UL (ref 0–0.4)
EOSINOPHILS RELATIVE PERCENT: 2 % (ref 1–4)
ERYTHROCYTE [DISTWIDTH] IN BLOOD BY AUTOMATED COUNT: 16.6 % (ref 12.5–15.4)
GFR, ESTIMATED: >90 ML/MIN/1.73M2
GLUCOSE SERPL-MCNC: 117 MG/DL (ref 70–99)
GLUCOSE UR STRIP-MCNC: NEGATIVE MG/DL
HCG SERPL QL: NEGATIVE
HCT VFR BLD AUTO: 28 % (ref 36–46)
HGB BLD-MCNC: 8.4 G/DL (ref 12–16)
HGB UR QL STRIP.AUTO: NEGATIVE
KETONES UR STRIP-MCNC: NEGATIVE MG/DL
LACTATE BLDV-SCNC: 1.7 MMOL/L (ref 0.5–2.2)
LEUKOCYTE ESTERASE UR QL STRIP: NEGATIVE
LIPASE SERPL-CCNC: 22 U/L (ref 13–60)
LYMPHOCYTES NFR BLD: 2.04 K/UL (ref 1–4.8)
LYMPHOCYTES RELATIVE PERCENT: 12 % (ref 24–44)
MCH RBC QN AUTO: 20.8 PG (ref 26–34)
MCHC RBC AUTO-ENTMCNC: 30 G/DL (ref 31–37)
MCV RBC AUTO: 69.4 FL (ref 80–100)
MONOCYTES NFR BLD: 0.68 K/UL (ref 0.1–0.8)
MONOCYTES NFR BLD: 4 % (ref 1–7)
MORPHOLOGY: ABNORMAL
NEUTROPHILS NFR BLD: 82 % (ref 36–66)
NEUTS SEG NFR BLD: 13.94 K/UL (ref 1.8–7.7)
NITRITE UR QL STRIP: NEGATIVE
PH UR STRIP: 7.5 [PH] (ref 5–8)
PLATELET # BLD AUTO: 300 K/UL (ref 140–450)
PMV BLD AUTO: 8.1 FL (ref 6–12)
POTASSIUM SERPL-SCNC: 3.4 MMOL/L (ref 3.7–5.3)
PROT SERPL-MCNC: 6.3 G/DL (ref 6.4–8.3)
PROT UR STRIP-MCNC: NEGATIVE MG/DL
RBC # BLD AUTO: 4.04 M/UL (ref 4–5.2)
SODIUM SERPL-SCNC: 139 MMOL/L (ref 135–144)
SP GR UR STRIP: 1.01 (ref 1–1.03)
UROBILINOGEN UR STRIP-ACNC: NORMAL EU/DL (ref 0–1)
WBC OTHER # BLD: 17 K/UL (ref 3.5–11)

## 2024-08-14 PROCEDURE — 96374 THER/PROPH/DIAG INJ IV PUSH: CPT

## 2024-08-14 PROCEDURE — 83690 ASSAY OF LIPASE: CPT

## 2024-08-14 PROCEDURE — 80053 COMPREHEN METABOLIC PANEL: CPT

## 2024-08-14 PROCEDURE — 81003 URINALYSIS AUTO W/O SCOPE: CPT

## 2024-08-14 PROCEDURE — 2580000003 HC RX 258: Performed by: PHYSICIAN ASSISTANT

## 2024-08-14 PROCEDURE — 99285 EMERGENCY DEPT VISIT HI MDM: CPT

## 2024-08-14 PROCEDURE — 36415 COLL VENOUS BLD VENIPUNCTURE: CPT

## 2024-08-14 PROCEDURE — 74177 CT ABD & PELVIS W/CONTRAST: CPT

## 2024-08-14 PROCEDURE — 2500000003 HC RX 250 WO HCPCS: Performed by: PHYSICIAN ASSISTANT

## 2024-08-14 PROCEDURE — 85025 COMPLETE CBC W/AUTO DIFF WBC: CPT

## 2024-08-14 PROCEDURE — 6360000004 HC RX CONTRAST MEDICATION: Performed by: PHYSICIAN ASSISTANT

## 2024-08-14 PROCEDURE — 83605 ASSAY OF LACTIC ACID: CPT

## 2024-08-14 PROCEDURE — 84703 CHORIONIC GONADOTROPIN ASSAY: CPT

## 2024-08-14 RX ORDER — FAMOTIDINE 20 MG/1
20 TABLET, FILM COATED ORAL 2 TIMES DAILY
Qty: 60 TABLET | Refills: 0 | Status: SHIPPED | OUTPATIENT
Start: 2024-08-14

## 2024-08-14 RX ORDER — SODIUM CHLORIDE 0.9 % (FLUSH) 0.9 %
10 SYRINGE (ML) INJECTION ONCE
Status: COMPLETED | OUTPATIENT
Start: 2024-08-14 | End: 2024-08-14

## 2024-08-14 RX ORDER — MAGNESIUM CARB/ALUMINUM HYDROX 105-160MG
143 TABLET,CHEWABLE ORAL ONCE
Qty: 296 ML | Refills: 0 | Status: SHIPPED | OUTPATIENT
Start: 2024-08-14 | End: 2024-08-14

## 2024-08-14 RX ORDER — 0.9 % SODIUM CHLORIDE 0.9 %
80 INTRAVENOUS SOLUTION INTRAVENOUS ONCE
Status: DISCONTINUED | OUTPATIENT
Start: 2024-08-14 | End: 2024-08-15 | Stop reason: HOSPADM

## 2024-08-14 RX ADMIN — IOPAMIDOL 75 ML: 755 INJECTION, SOLUTION INTRAVENOUS at 21:37

## 2024-08-14 RX ADMIN — SODIUM CHLORIDE, PRESERVATIVE FREE 10 ML: 5 INJECTION INTRAVENOUS at 21:37

## 2024-08-14 RX ADMIN — FAMOTIDINE 20 MG: 10 INJECTION, SOLUTION INTRAVENOUS at 21:09

## 2024-08-14 RX ADMIN — Medication 80 ML: at 21:38

## 2024-08-14 ASSESSMENT — PAIN - FUNCTIONAL ASSESSMENT: PAIN_FUNCTIONAL_ASSESSMENT: 0-10

## 2024-08-14 ASSESSMENT — PAIN DESCRIPTION - LOCATION: LOCATION: ABDOMEN

## 2024-08-14 ASSESSMENT — PAIN SCALES - GENERAL: PAINLEVEL_OUTOF10: 6

## 2024-08-15 NOTE — DISCHARGE INSTRUCTIONS
Pepcid as directed.  Magnesium citrate and milk of magnesia as directed.  Drink plenty of fluids and get extra rest.  Return for worsening pain, fever, vomiting, or if worse in any way.    PLEASE RETURN TO THE EMERGENCY DEPARTMENT IMMEDIATELY if your symptoms worsen in anyway or in 8-12 hours if not improved for re-evaluation.  You should immediately return to the ER for symptoms such as increasing pain, bloody stool, fever, a feeling of passing out, light headed, dizziness, chest pain, shortness of breath, persistent nausea and/or vomiting, numbness or weakness to the arms or legs, coolness or color change of the arms or legs.      Take your medication as indicated and prescribed.  If you are given an antibiotic then, make sure you get the prescription filled and take the antibiotics until finished.      Please understand that at this time there is no evidence for a more serious underlying process, but that early in the process of an illness or injury, an emergency department workup can be falsely reassuring.  You should contact your family doctor within the next 24 hours for a follow up appointment    THANK YOU!!!    From [facility] and [facility]    On behalf of the Emergency Department staff at [facility], I would like to thank you for giving us the opportunity to address your health care needs and concerns.    We hope that during your visit, our service was delivered in a professional and caring manner. Please keep [facility] in mind as we walk with you down the path to your own personal wellness.     Please expect an automated text message or email from us so we can ask a few questions about your health and progress. Based on your answers, a clinician may call you back to offer help and instructions.    Please understand that early in the process of an illness or injury, an emergency department workup can be falsely reassuring.  If you notice any worsening, changing or persistent

## 2024-08-15 NOTE — ED PROVIDER NOTES
EMERGENCY DEPARTMENT ENCOUNTER      Pt Name: Valeria Becerril  MRN: [id number]  Birthdate 1995  Date of evaluation: 2024  Provider: Gary Mckeon PA-C    CHIEF COMPLAINT       Chief Complaint   Patient presents with    Fever     Fever 100F at home today; \"I was having an EGD done at [facility], and they had to stop it because I started crashing. I have a bowel obstruction and a stomach ulcer, and they released me without giving me any medication.\" Abdominal pain; constipation for 2 months, has tried multiple laxatives and stool softeners without relief, had small BM today         HISTORY OF PRESENT ILLNESS      Valeria Becerril is a 29 y.o. female who presents to the emergency department complaining of feeling ill and complaining of abdominal pain.  Patient states that she has been dealing with gastrointestinal issues, today she had an EGD and colonoscopy states that the doctor did not complete the tests due to patient not being completely cleared out.  She states that she took a whole bottle of MiraLAX and did not have any results.  She states that she was discharged home without any intervention.        REVIEW OF SYSTEMS       Review of Systems   AS STATED IN HPI      PAST MEDICAL HISTORY     Past Medical History:   Diagnosis Date    BBB (bundle branch block)     Endometriosis     GERD (gastroesophageal reflux disease)     History of miscarriage     Migraine     MVP (mitral valve prolapse)          SURGICAL HISTORY       Past Surgical History:   Procedure Laterality Date    ABDOMINAL EXPLORATION SURGERY      BRONCHOSCOPY N/A 2023    BRONCHOSCOPY WITH BRONCHOALVEOLAR LAVAGE performed by Sathish Soares MD at [facility]     SECTION      x 2    ENDOSCOPY, COLON, DIAGNOSTIC           CURRENT MEDICATIONS       Previous Medications    ACETAMINOPHEN (TYLENOL) 500 MG TABLET    Take 2 tablets by mouth as needed    ALBUTEROL (PROVENTIL) (2.5 MG/3ML) 0.083% NEBULIZER SOLUTION    Take 3 mLs by 
magnesium citrate and I also will write for Pepcid.  The patient is discharged in good condition.     2234  Dr. George paged.  2240  Discussed with Dr. George.  Pt has pica and eats toilet paper.  EGD showed a gastric ulcer.  Colonoscopy could not be completed due to rock hard boulders of stool.  Probably undigestable material that hasn't passed yet.  Recommends MOM and enemas and mag citrate.     Chelsey Reynolds MD  08/15/24 [id number]

## 2025-02-09 ENCOUNTER — HOSPITAL ENCOUNTER (EMERGENCY)
Age: 30
Discharge: ANOTHER HOSPITAL | DRG: 751 | End: 2025-02-10
Attending: EMERGENCY MEDICINE
Payer: MEDICAID

## 2025-02-09 DIAGNOSIS — R45.851 SUICIDAL IDEATION: ICD-10-CM

## 2025-02-09 DIAGNOSIS — T50.902A INTENTIONAL OVERDOSE, INITIAL ENCOUNTER (HCC): Primary | ICD-10-CM

## 2025-02-09 LAB
ALBUMIN SERPL-MCNC: 4.2 G/DL (ref 3.5–5.2)
ALBUMIN/GLOB SERPL: 1.4 {RATIO} (ref 1–2.5)
ALP SERPL-CCNC: 68 U/L (ref 35–104)
ALT SERPL-CCNC: 11 U/L (ref 5–33)
AMPHET UR QL SCN: POSITIVE
ANION GAP SERPL CALCULATED.3IONS-SCNC: 7 MMOL/L (ref 9–17)
APAP SERPL-MCNC: <5 UG/ML (ref 10–30)
AST SERPL-CCNC: 15 U/L
BARBITURATES UR QL SCN: POSITIVE
BASOPHILS # BLD: 0 K/UL (ref 0–0.2)
BASOPHILS NFR BLD: 0 % (ref 0–2)
BENZODIAZ UR QL: POSITIVE
BILIRUB SERPL-MCNC: 0.1 MG/DL (ref 0.3–1.2)
BUN SERPL-MCNC: 10 MG/DL (ref 6–20)
CALCIUM SERPL-MCNC: 9.4 MG/DL (ref 8.6–10.4)
CANNABINOIDS UR QL SCN: NEGATIVE
CHLORIDE SERPL-SCNC: 99 MMOL/L (ref 98–107)
CO2 SERPL-SCNC: 31 MMOL/L (ref 20–31)
COCAINE UR QL SCN: NEGATIVE
CREAT SERPL-MCNC: 0.7 MG/DL (ref 0.5–0.9)
EOSINOPHIL # BLD: 0.47 K/UL (ref 0–0.4)
EOSINOPHILS RELATIVE PERCENT: 4 % (ref 1–4)
ERYTHROCYTE [DISTWIDTH] IN BLOOD BY AUTOMATED COUNT: 22.8 % (ref 12.5–15.4)
ETHANOL PERCENT: <0.01 %
ETHANOLAMINE SERPL-MCNC: <10 MG/DL (ref 0–0.08)
FENTANYL UR QL: NEGATIVE
GFR, ESTIMATED: >90 ML/MIN/1.73M2
GLUCOSE SERPL-MCNC: 95 MG/DL (ref 70–99)
HCG UR QL: NEGATIVE
HCT VFR BLD AUTO: 39.2 % (ref 36–46)
HGB BLD-MCNC: 12.7 G/DL (ref 12–16)
LYMPHOCYTES NFR BLD: 2.69 K/UL (ref 1–4.8)
LYMPHOCYTES RELATIVE PERCENT: 23 % (ref 24–44)
MCH RBC QN AUTO: 26 PG (ref 26–34)
MCHC RBC AUTO-ENTMCNC: 32.3 G/DL (ref 31–37)
MCV RBC AUTO: 80.6 FL (ref 80–100)
METHADONE UR QL: NEGATIVE
MONOCYTES NFR BLD: 1.05 K/UL (ref 0.1–1.2)
MONOCYTES NFR BLD: 9 % (ref 2–11)
MORPHOLOGY: ABNORMAL
NEUTROPHILS NFR BLD: 64 % (ref 36–66)
NEUTS SEG NFR BLD: 7.49 K/UL (ref 1.8–7.7)
OPIATES UR QL SCN: NEGATIVE
OXYCODONE UR QL SCN: NEGATIVE
PCP UR QL SCN: NEGATIVE
PLATELET # BLD AUTO: 250 K/UL (ref 140–450)
PMV BLD AUTO: 8.3 FL (ref 6–12)
POTASSIUM SERPL-SCNC: 3.7 MMOL/L (ref 3.7–5.3)
PROT SERPL-MCNC: 7.2 G/DL (ref 6.4–8.3)
RBC # BLD AUTO: 4.87 M/UL (ref 4–5.2)
SALICYLATES SERPL-MCNC: <1 MG/DL (ref 3–10)
SODIUM SERPL-SCNC: 137 MMOL/L (ref 135–144)
SPECIMEN SOURCE: NORMAL
STREP A, MOLECULAR: NEGATIVE
TEST INFORMATION: ABNORMAL
WBC OTHER # BLD: 11.7 K/UL (ref 3.5–11)

## 2025-02-09 PROCEDURE — 81025 URINE PREGNANCY TEST: CPT

## 2025-02-09 PROCEDURE — 99285 EMERGENCY DEPT VISIT HI MDM: CPT

## 2025-02-09 PROCEDURE — 80143 DRUG ASSAY ACETAMINOPHEN: CPT

## 2025-02-09 PROCEDURE — 93005 ELECTROCARDIOGRAM TRACING: CPT | Performed by: PHYSICIAN ASSISTANT

## 2025-02-09 PROCEDURE — 80179 DRUG ASSAY SALICYLATE: CPT

## 2025-02-09 PROCEDURE — 85025 COMPLETE CBC W/AUTO DIFF WBC: CPT

## 2025-02-09 PROCEDURE — G0480 DRUG TEST DEF 1-7 CLASSES: HCPCS

## 2025-02-09 PROCEDURE — 80307 DRUG TEST PRSMV CHEM ANLYZR: CPT

## 2025-02-09 PROCEDURE — 87651 STREP A DNA AMP PROBE: CPT

## 2025-02-09 PROCEDURE — 36415 COLL VENOUS BLD VENIPUNCTURE: CPT

## 2025-02-09 PROCEDURE — 80053 COMPREHEN METABOLIC PANEL: CPT

## 2025-02-09 ASSESSMENT — PAIN DESCRIPTION - LOCATION: LOCATION: OTHER (COMMENT)
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
## 2025-02-10 ENCOUNTER — HOSPITAL ENCOUNTER (INPATIENT)
Age: 30
LOS: 2 days | Discharge: HOME | DRG: 751 | End: 2025-02-12
Attending: PSYCHIATRY & NEUROLOGY | Admitting: PSYCHIATRY & NEUROLOGY
Payer: MEDICAID

## 2025-02-10 VITALS
RESPIRATION RATE: 12 BRPM | SYSTOLIC BLOOD PRESSURE: 117 MMHG | DIASTOLIC BLOOD PRESSURE: 55 MMHG | TEMPERATURE: 98 F | HEART RATE: 86 BPM | OXYGEN SATURATION: 95 %

## 2025-02-10 DIAGNOSIS — F11.23 OPIOID DEPENDENCE WITH WITHDRAWAL (HCC): Primary | ICD-10-CM

## 2025-02-10 LAB
EKG ATRIAL RATE: 74 BPM
EKG P AXIS: 46 DEGREES
EKG P-R INTERVAL: 202 MS
EKG Q-T INTERVAL: 416 MS
EKG QRS DURATION: 102 MS
EKG QTC CALCULATION (BAZETT): 461 MS
EKG R AXIS: 30 DEGREES
EKG T AXIS: 5 DEGREES
EKG VENTRICULAR RATE: 74 BPM

## 2025-02-10 PROCEDURE — 99222 1ST HOSP IP/OBS MODERATE 55: CPT | Performed by: INTERNAL MEDICINE

## 2025-02-10 PROCEDURE — 99223 1ST HOSP IP/OBS HIGH 75: CPT | Performed by: PSYCHIATRY & NEUROLOGY

## 2025-02-10 PROCEDURE — APPSS60 APP SPLIT SHARED TIME 46-60 MINUTES

## 2025-02-10 PROCEDURE — 93010 ELECTROCARDIOGRAM REPORT: CPT | Performed by: INTERNAL MEDICINE

## 2025-02-10 PROCEDURE — 6370000000 HC RX 637 (ALT 250 FOR IP): Performed by: PSYCHIATRY & NEUROLOGY

## 2025-02-10 PROCEDURE — 1240000000 HC EMOTIONAL WELLNESS R&B

## 2025-02-10 RX ORDER — CHLORPROMAZINE HCI 25 MG/ML
50 INJECTION INTRAMUSCULAR 3 TIMES DAILY PRN
Status: DISCONTINUED | OUTPATIENT
Start: 2025-02-10 | End: 2025-02-12 | Stop reason: HOSPADM

## 2025-02-10 RX ORDER — BUPRENORPHINE AND NALOXONE 4; 1 MG/1; MG/1
1 FILM, SOLUBLE BUCCAL; SUBLINGUAL DAILY
Status: DISCONTINUED | OUTPATIENT
Start: 2025-02-11 | End: 2025-02-11

## 2025-02-10 RX ORDER — CHLORPROMAZINE HYDROCHLORIDE 25 MG/1
50 TABLET, FILM COATED ORAL 3 TIMES DAILY PRN
Status: DISCONTINUED | OUTPATIENT
Start: 2025-02-10 | End: 2025-02-12 | Stop reason: HOSPADM

## 2025-02-10 RX ORDER — POLYETHYLENE GLYCOL 3350 17 G
2 POWDER IN PACKET (EA) ORAL
Status: DISCONTINUED | OUTPATIENT
Start: 2025-02-10 | End: 2025-02-12 | Stop reason: HOSPADM

## 2025-02-10 RX ORDER — BUDESONIDE AND FORMOTEROL FUMARATE DIHYDRATE 160; 4.5 UG/1; UG/1
2 AEROSOL RESPIRATORY (INHALATION)
Status: DISCONTINUED | OUTPATIENT
Start: 2025-02-10 | End: 2025-02-12 | Stop reason: HOSPADM

## 2025-02-10 RX ORDER — SERTRALINE HYDROCHLORIDE 25 MG/1
25 TABLET, FILM COATED ORAL DAILY
Status: DISCONTINUED | OUTPATIENT
Start: 2025-02-11 | End: 2025-02-12 | Stop reason: HOSPADM

## 2025-02-10 RX ORDER — TRAZODONE HYDROCHLORIDE 50 MG/1
50 TABLET ORAL NIGHTLY PRN
Status: DISCONTINUED | OUTPATIENT
Start: 2025-02-10 | End: 2025-02-12 | Stop reason: HOSPADM

## 2025-02-10 RX ORDER — LEVOTHYROXINE SODIUM 50 UG/1
50 TABLET ORAL DAILY
Status: DISCONTINUED | OUTPATIENT
Start: 2025-02-11 | End: 2025-02-12 | Stop reason: HOSPADM

## 2025-02-10 RX ORDER — ACETAMINOPHEN 325 MG/1
650 TABLET ORAL EVERY 6 HOURS PRN
Status: DISCONTINUED | OUTPATIENT
Start: 2025-02-10 | End: 2025-02-12 | Stop reason: HOSPADM

## 2025-02-10 RX ORDER — HYDROXYZINE HYDROCHLORIDE 50 MG/1
50 TABLET, FILM COATED ORAL 3 TIMES DAILY PRN
Status: DISCONTINUED | OUTPATIENT
Start: 2025-02-10 | End: 2025-02-12 | Stop reason: HOSPADM

## 2025-02-10 RX ORDER — MAGNESIUM HYDROXIDE/ALUMINUM HYDROXICE/SIMETHICONE 120; 1200; 1200 MG/30ML; MG/30ML; MG/30ML
30 SUSPENSION ORAL EVERY 6 HOURS PRN
Status: DISCONTINUED | OUTPATIENT
Start: 2025-02-10 | End: 2025-02-12 | Stop reason: HOSPADM

## 2025-02-10 RX ORDER — POLYETHYLENE GLYCOL 3350 17 G/17G
17 POWDER, FOR SOLUTION ORAL DAILY PRN
Status: DISCONTINUED | OUTPATIENT
Start: 2025-02-10 | End: 2025-02-12 | Stop reason: HOSPADM

## 2025-02-10 RX ADMIN — ACETAMINOPHEN 650 MG: 325 TABLET ORAL at 21:00

## 2025-02-10 ASSESSMENT — LIFESTYLE VARIABLES
HOW OFTEN DO YOU HAVE A DRINK CONTAINING ALCOHOL: NEVER
HOW MANY STANDARD DRINKS CONTAINING ALCOHOL DO YOU HAVE ON A TYPICAL DAY: PATIENT DOES NOT DRINK
HOW OFTEN DO YOU HAVE A DRINK CONTAINING ALCOHOL: NEVER
HOW MANY STANDARD DRINKS CONTAINING ALCOHOL DO YOU HAVE ON A TYPICAL DAY: PATIENT DOES NOT DRINK
HOW OFTEN DO YOU HAVE A DRINK CONTAINING ALCOHOL: NEVER
HOW MANY STANDARD DRINKS CONTAINING ALCOHOL DO YOU HAVE ON A TYPICAL DAY: PATIENT DOES NOT DRINK
HOW OFTEN DO YOU HAVE A DRINK CONTAINING ALCOHOL: NEVER
HOW MANY STANDARD DRINKS CONTAINING ALCOHOL DO YOU HAVE ON A TYPICAL DAY: PATIENT DOES NOT DRINK

## 2025-02-10 ASSESSMENT — PATIENT HEALTH QUESTIONNAIRE - PHQ9
1. LITTLE INTEREST OR PLEASURE IN DOING THINGS: NOT AT ALL
SUM OF ALL RESPONSES TO PHQ9 QUESTIONS 1 & 2: 0
SUM OF ALL RESPONSES TO PHQ QUESTIONS 1-9: 0
2. FEELING DOWN, DEPRESSED OR HOPELESS: NOT AT ALL
SUM OF ALL RESPONSES TO PHQ QUESTIONS 1-9: 0

## 2025-02-10 ASSESSMENT — PAIN SCALES - WONG BAKER: WONGBAKER_NUMERICALRESPONSE: NO HURT

## 2025-02-10 ASSESSMENT — PAIN SCALES - GENERAL
PAINLEVEL_OUTOF10: 6
PAINLEVEL_OUTOF10: 2

## 2025-02-10 ASSESSMENT — SLEEP AND FATIGUE QUESTIONNAIRES
AVERAGE NUMBER OF SLEEP HOURS: 5
DO YOU HAVE DIFFICULTY SLEEPING: NO
DO YOU USE A SLEEP AID: NO

## 2025-02-10 ASSESSMENT — PAIN DESCRIPTION - DESCRIPTORS: DESCRIPTORS: ACHING

## 2025-02-10 ASSESSMENT — PAIN DESCRIPTION - LOCATION: LOCATION: HEAD

## 2025-02-10 NOTE — GROUP NOTE
Group Therapy Note    Date: 2/10/2025    Group Start Time: 1330  Group End Time: 1415  Group Topic: Relaxation    CZ Anita Galindo CTRS    Relaxation Group Note        Date: February 10, 2025 Start Time: 1:30pm  End Time:  215pm      Number of Participants in Group & Unit Census:  2/10    Topic: 2/10    Goal of Group:pt will identify benefits of using art for coping and relaxation       Comments:     Patient did not participate in Relaxation group, despite staff encouragement and explanation of benefits.  Patient remain seclusive to self.  Q15 minute safety checks maintained for patient safety and will continue to encourage patient to attend unit programming.              Signature:  RALF ABBOTT

## 2025-02-10 NOTE — GROUP NOTE
Group Therapy Note    Date: 2/10/2025    Group Start Time: 0900  Group End Time: 0946  Group Topic: Community Meeting    Sofie Borrego LPN        Group Therapy Note    Attendees: 2/12    patient refused to attend Goals group at 0900 after encouragement from staff.  1:1 talk time provided as alternative to group session      Signature:  Sofie Armenta LPN
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
## 2025-02-10 NOTE — GROUP NOTE
Group Therapy Note    Date: 2/10/2025    Group Start Time: 1010  Group End Time: 1030  Group Topic: Psychoeducation    Rigo Peterson        Group Therapy Note    Attendees: 5/12     Patient was offered group therapy today but declined to participate despite encouragement from staff. 1:1 was offered.    Signature:  Rigo Haines

## 2025-02-10 NOTE — GROUP NOTE
Group Therapy Note    Date: 2/10/2025    Group Start Time: 1100  Group End Time: 1140  Group Topic: Recreational    [facility] Anita Galindo CTRS    Recreation Group Note        Date: February 10, 2025 Start Time: 11am  End Time:  1140 am      Number of Participants in Group & Unit Census:  5/11    Topic: recreation group     Goal of Group: pt will demonstrate improved leisure awareness and improved interpersonal relationships       Comments:     Patient did not participate in Recreation group, despite staff encouragement and explanation of benefits.  Patient remain seclusive to self.  Q15 minute safety checks maintained for patient safety and will continue to encourage patient to attend unit programming.              Signature:  RALF ABBOTT

## 2025-02-10 NOTE — H&P
[facility] Internal Medicine  Devon Reyse MD; Simon Blount MD, Saurav Rios MD, Emilie Cardenas MD, Gardenia Mondragon MD; Ivone Dawson MD    [facility] Medicine   IN-PATIENT SERVICE   [facility]     HISTORY AND PHYSICAL EXAMINATION            Date:   2/10/2025  Patient name:  Valeria Becerril  Date of admission:  2/10/2025  6:13 AM  MRN:   [id number]  Account:  [id number]  YOB: 1995  PCP:    Michelle Chatterjee APRN - NP  Room:   [address] [place] [address]  Code Status:    Full Code      Chief Complaint:     Mental health disorder    History Obtained From:     Patient/EMR/bedside RN     History of Present Illness:     29-year-old  lady with history of endometriosis history of GERD history of migraine mitral valve prolapse history of mental disorder depression admitted for same denies any fever chills nausea vomiting no significant weight loss or weight gain recently no history of hypo or hypothyroidism no history of HIV tuberculous  Class II obesity BMI 37.8    Past Medical History:     Past Medical History:   Diagnosis Date    BBB (bundle branch block)     Endometriosis     GERD (gastroesophageal reflux disease)     History of miscarriage     Migraine     MVP (mitral valve prolapse)         Past Surgical History:     Past Surgical History:   Procedure Laterality Date    ABDOMINAL EXPLORATION SURGERY      BRONCHOSCOPY N/A 2023    BRONCHOSCOPY WITH BRONCHOALVEOLAR LAVAGE performed by Sathish Soares MD at [facility]     SECTION      x 2    ENDOSCOPY, COLON, DIAGNOSTIC          Medications Prior to Admission:     Prior to Admission medications    Medication Sig Start Date End Date Taking? Authorizing Provider   famotidine (PEPCID) 20 MG tablet Take 1 tablet by mouth 2 times daily 24   Chelsey Reynolds MD   budesonide-formoterol (SYMBICORT) 160-4.5 MCG/ACT AERO Inhale 2 puffs into the lungs in the morning and 2 puffs

## 2025-02-10 NOTE — GROUP NOTE
Group Therapy Note    Date: 2/10/2025    Group Start Time: 1430  Group End Time: 1500  Group Topic: Discharge Planning    Radha Samano RN    Discharge planning  Group Note        Date: February 10, 2025 Start Time: 2:30pm  End Time: 3pm      Number of Participants in Group & Unit Census:  5/10    Topic: discharge planning    Goal of Group:pt will fill out discharge plan      Comments:     Patient did not participate in  discharge planning  group, despite staff encouragement and explanation of benefits.  Patient remain seclusive to self.  Q15 minute safety checks maintained for patient safety and will continue to encourage patient to attend unit programming.
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
## 2025-02-10 NOTE — PROGRESS NOTES
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
## 2025-02-10 NOTE — PROGRESS NOTES
Pharmacy Medication History Note      List of current medications patient is taking is complete.    Source of information: [facility] ([place] at [phone]), [facility] ([place] at ), Epic, PDMP, [employer] dispense report    Changes made to medication list:  Medications removed (include reason, ex. therapy complete or physician discontinued, noncompliance):  Nadolol (list clean up), Fioricet (list clean up), Cyclobenzaprine (list clean up), Albuterol nebulizer solution (list clean up), Acetaminophen (list clean up), Bacillus (list clean up)    Medications flagged for provider review:  Symbicort, Famotidine, and Levothyroxine - no fill history found for the last three months    Medications added/doses adjusted:  Adjusted Suboxone 8/2 mg Sl film to 3 films daily - LF 1/14/25 (qty 84 for 28 ds)        Current Home Medication List at Time of Admission:  Prior to Admission medications    Medication Sig   buprenorphine-naloxone (SUBOXONE) 8-2 MG FILM SL film Place 3 Film under the tongue daily.         Please let me know if you have any questions about this encounter. Thank you!    Electronically signed by Eli Almonte RPH on 2/10/2025 at 9:52 AM
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
## 2025-02-10 NOTE — PROGRESS NOTES
Behavioral Services  Medicare Certification Upon Admission    I certify that this patient's inpatient psychiatric hospital admission is medically necessary for:    [x] (1) Treatment which could reasonably be expected to improve this patient's condition,       [x] (2) Or for diagnostic study;     AND     [x](2) The inpatient psychiatric services are provided while the individual is under the care of a physician and are included in the individualized plan of care.    Estimated length of stay/service 2-9 days    Plan for post-hospital care -outpatient care    Electronically signed by POONAM TOBAR MD on 2/10/2025 at 9:40 AM

## 2025-02-10 NOTE — ED NOTES
[facility] arrives to transport pt to [facility] unit, rm [id number]. Report called to unit.

## 2025-02-10 NOTE — PLAN OF CARE
Problem: Self Harm/Suicidality  Goal: Will have no self-injury during hospital stay  Description: INTERVENTIONS:  1.  Ensure constant observer at bedside with Q15M safety checks  2.  Maintain a safe environment  3.  Secure patient belongings  4.  Ensure family/visitors adhere to safety recommendations  5.  Ensure safety tray has been added to patient's diet order  6.  Every shift and PRN: Re-assess suicidal risk via Frequent Screener    Outcome: Progressing  Flowsheets (Taken 2/10/2025 0800)  Will have no self-injury during hospital stay:   Ensure constant observer at bedside with Q15M safety checks   Maintain a safe environment   Secure patient belongings   Patient has been in bed sleeping comfortably. 15 minute checks continued  Problem: Depression  Goal: Will be euthymic at discharge  Description: INTERVENTIONS:  1. Administer medication as ordered  2. Provide emotional support via 1:1 interaction with staff  3. Encourage involvement in milieu/groups/activities  4. Monitor for social isolation  Outcome: Progressing     Problem: Risk for Elopement  Goal: Patient will not exit the unit/facility without proper excort  Outcome: Progressing  Flowsheets (Taken 2/10/2025 0800)  Nursing Interventions for Elopement Risk: Assist with personal care needs such as toileting, eating, dressing, as needed to reduce the risk of wandering   Patient has maintained behavioral control in her room this shift with no exit seeking behaviors.
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
## 2025-02-11 PROCEDURE — 6370000000 HC RX 637 (ALT 250 FOR IP): Performed by: PSYCHIATRY & NEUROLOGY

## 2025-02-11 PROCEDURE — 90833 PSYTX W PT W E/M 30 MIN: CPT | Performed by: PSYCHIATRY & NEUROLOGY

## 2025-02-11 PROCEDURE — 6370000000 HC RX 637 (ALT 250 FOR IP): Performed by: INTERNAL MEDICINE

## 2025-02-11 PROCEDURE — 1240000000 HC EMOTIONAL WELLNESS R&B

## 2025-02-11 PROCEDURE — 99232 SBSQ HOSP IP/OBS MODERATE 35: CPT | Performed by: PSYCHIATRY & NEUROLOGY

## 2025-02-11 PROCEDURE — 99232 SBSQ HOSP IP/OBS MODERATE 35: CPT | Performed by: INTERNAL MEDICINE

## 2025-02-11 RX ORDER — BUTALBITAL, ACETAMINOPHEN AND CAFFEINE 300; 40; 50 MG/1; MG/1; MG/1
1 CAPSULE ORAL EVERY 4 HOURS PRN
Status: DISCONTINUED | OUTPATIENT
Start: 2025-02-11 | End: 2025-02-12 | Stop reason: HOSPADM

## 2025-02-11 RX ORDER — BUPRENORPHINE AND NALOXONE 4; 1 MG/1; MG/1
2 FILM, SOLUBLE BUCCAL; SUBLINGUAL DAILY
Status: DISCONTINUED | OUTPATIENT
Start: 2025-02-12 | End: 2025-02-12 | Stop reason: HOSPADM

## 2025-02-11 RX ADMIN — BUPRENORPHINE AND NALOXONE 1 FILM: 4; 1 FILM, SOLUBLE BUCCAL; SUBLINGUAL at 08:16

## 2025-02-11 RX ADMIN — BUDESONIDE AND FORMOTEROL FUMARATE DIHYDRATE 2 PUFF: 160; 4.5 AEROSOL RESPIRATORY (INHALATION) at 08:16

## 2025-02-11 RX ADMIN — ACETAMINOPHEN 650 MG: 325 TABLET ORAL at 09:32

## 2025-02-11 RX ADMIN — BUTALBITA,ACETAMINOPHEN AND CAFFEINE 1 CAPSULE: 50; 300; 40 CAPSULE ORAL at 16:10

## 2025-02-11 RX ADMIN — ACETAMINOPHEN 650 MG: 325 TABLET ORAL at 03:26

## 2025-02-11 RX ADMIN — BUTALBITA,ACETAMINOPHEN AND CAFFEINE 1 CAPSULE: 50; 300; 40 CAPSULE ORAL at 20:17

## 2025-02-11 RX ADMIN — HYDROXYZINE HYDROCHLORIDE 50 MG: 50 TABLET, FILM COATED ORAL at 21:38

## 2025-02-11 RX ADMIN — TRAZODONE HYDROCHLORIDE 50 MG: 50 TABLET ORAL at 21:38

## 2025-02-11 RX ADMIN — LEVOTHYROXINE SODIUM 50 MCG: 0.05 TABLET ORAL at 06:17

## 2025-02-11 RX ADMIN — ACETAMINOPHEN 650 MG: 325 TABLET ORAL at 15:25

## 2025-02-11 ASSESSMENT — PAIN SCALES - WONG BAKER: WONGBAKER_NUMERICALRESPONSE: HURTS A LITTLE BIT

## 2025-02-11 ASSESSMENT — PAIN DESCRIPTION - ORIENTATION: ORIENTATION: MID

## 2025-02-11 ASSESSMENT — PAIN DESCRIPTION - LOCATION
LOCATION: HEAD
LOCATION: ABDOMEN
LOCATION: HEAD
LOCATION: HEAD
LOCATION: ABDOMEN

## 2025-02-11 ASSESSMENT — PAIN DESCRIPTION - DESCRIPTORS
DESCRIPTORS: ACHING
DESCRIPTORS: CRAMPING
DESCRIPTORS: ACHING
DESCRIPTORS: ACHING;CRAMPING

## 2025-02-11 ASSESSMENT — PAIN SCALES - GENERAL
PAINLEVEL_OUTOF10: 7
PAINLEVEL_OUTOF10: 6
PAINLEVEL_OUTOF10: 5
PAINLEVEL_OUTOF10: 7
PAINLEVEL_OUTOF10: 6
PAINLEVEL_OUTOF10: 5
PAINLEVEL_OUTOF10: 3
PAINLEVEL_OUTOF10: 7

## 2025-02-11 NOTE — GROUP NOTE
Group Therapy Note    Date: 2/11/2025    Group Start Time: 1100  Group End Time: 1135  Group Topic: Cognitive Skills    [facility]    Anita Fine CTRS        Group Therapy Note    Attendees: 4/12       Patient's Goal:  pt will demonstrate improved coping skills and improved interpersonal relationships    Notes:  pt was pleasant and participated well    Status After Intervention:  Improved    Participation Level: Active Listener and Interactive    Participation Quality: Appropriate, Attentive, and Supportive      Speech:  normal      Thought Process/Content: Logical      Affective Functioning: Congruent      Mood: euthymic      Level of consciousness:  Alert      Response to Learning: Able to verbalize current knowledge/experience, Capable of insight, and Progressing to goal      Endings: None Reported    Modes of Intervention: Support, Socialization, and Activity      Discipline Responsible: Psychoeducational Specialist      Signature:  RALF ABBOTT

## 2025-02-11 NOTE — PROGRESS NOTES
into the lungs in the morning and 2 puffs in the evening.  Patient not taking: Reported on 2/10/2025 4/20/23   Joya Mondragon MD   buprenorphine-naloxone (SUBOXONE) 8-2 MG FILM SL film Place 3 Film under the tongue daily.    Provider, MD Michael   levothyroxine (SYNTHROID) 50 MCG tablet Take 1 tablet by mouth Daily  Patient not taking: Reported on 2/10/2025 3/29/23   Provider, MD Michael        Allergies:     Bee venom, Ziprasidone hcl, Depakote er [divalproex sodium er], Haloperidol, Toradol [ketorolac tromethamine], Azithromycin, and Morphine and codeine    Social History:     Tobacco:    reports that she has quit smoking. Her smoking use included cigarettes. She has never used smokeless tobacco.  Alcohol:      reports that she does not currently use alcohol.  Drug Use:  reports that she does not currently use drugs after having used the following drugs: Other-see comments.    Family History:     History reviewed. No pertinent family history.    Review of Systems:     Positive and Negative as described in HPI.    CONSTITUTIONAL:  negative for fevers, chills, sweats, fatigue, weight loss  HEENT:  negative for vision, hearing changes, runny nose, throat pain  RESPIRATORY:  negative for shortness of breath, cough, congestion, wheezing.  CARDIOVASCULAR:  negative for chest pain, palpitations.  GASTROINTESTINAL:  negative for nausea, vomiting, diarrhea, constipation, change in bowel habits, abdominal pain   GENITOURINARY:  negative for difficulty of urination, burning with urination, frequency   INTEGUMENT:  negative for rash, skin lesions, easy bruising   HEMATOLOGIC/LYMPHATIC:  negative for swelling/edema   ALLERGIC/IMMUNOLOGIC:  negative for urticaria , itching  ENDOCRINE:  negative increase in drinking, increase in urination, hot or cold intolerance  MUSCULOSKELETAL:  negative joint pains, muscle aches, swelling of joints  NEUROLOGICAL:  negative for headaches, dizziness, lightheadedness, numbness, pain,  or hypothyroidism no history of HIV tuberculous  Class II obesity BMI 37.8  Labs vitals reviewed agree with above treatment  Drug screen noted counseled again street drug  Class II obesity diet exercise and weight loss    DVT prophylaxis,  Pt mobile   Full code status   Fioricet fro  headache  prn      Consultations:   IP CONSULT TO INTERNAL MEDICINE      Devon Reyes MD  2/11/2025  10:08 AM    Copy sent to Michelle Barnes, APRN - NP    Please note that this chart was generated using voice recognition Dragon dictation software.  Although every effort was made to ensure the accuracy of this automated transcription, some errors in transcription may have occurred.

## 2025-02-11 NOTE — GROUP NOTE
Group Therapy Note    Date: 2/10/2025    Group Start Time: 2015  Group End Time: 2045  Group Topic: Wrap-Up    [facility]    Nils Diaz, RN        Group Therapy Note    Attendees: 7/10       Patient's Goal:  none    Notes:  helpless/hopeless    Status After Intervention:  Unchanged    Participation Level: Active Listener and Interactive    Participation Quality: Appropriate, Attentive, and Sharing      Speech:  hesitant      Thought Process/Content: Logical      Affective Functioning: Congruent      Mood: anxious      Level of consciousness:  Alert, Oriented x4, and Attentive      Response to Learning: Able to verbalize current knowledge/experience and Able to verbalize/acknowledge new learning      Endings: Suicidality    Modes of Intervention: Education, Support, and Socialization      Discipline Responsible: Registered Nurse      Signature:  Nils Diaz RN

## 2025-02-11 NOTE — CARE COORDINATION
SOCIAL SERVICE NOTE:  telephoned [facility] treatment program at [phone] with permission from patient.  let them know that patient is currently in inpatient treatment, so she will not be able to make the scheduled appointment that she has today 2/11 2025 at 10:40 am, staff reports that they will cintact the provider to let them know. They requested to have patient call them when discharged so that the appointment can be rescheduled.

## 2025-02-11 NOTE — PLAN OF CARE
Problem: Self Harm/Suicidality  Goal: Will have no self-injury during hospital stay  Description: INTERVENTIONS:  1.  Ensure constant observer at bedside with Q15M safety checks  2.  Maintain a safe environment  3.  Secure patient belongings  4.  Ensure family/visitors adhere to safety recommendations  5.  Ensure safety tray has been added to patient's diet order  6.  Every shift and PRN: Re-assess suicidal risk via Frequent Screener    2/10/2025 2332 by Clifford Dia LPN  Outcome: Progressing  Note: Patient denies self harm this shift. Every 15 minute checks continue per policy.     Problem: Depression  Goal: Will be euthymic at discharge  Description: INTERVENTIONS:  1. Administer medication as ordered  2. Provide emotional support via 1:1 interaction with staff  3. Encourage involvement in milieu/groups/activities  4. Monitor for social isolation  2/10/2025 2332 by Clifford Dia LPN  Note: Patient denies depression this shift. Patient did attend group this shift, dicussed goals upon discharge.

## 2025-02-11 NOTE — GROUP NOTE
Group Therapy Note    Date: 2/11/2025    Group Start Time: 1000  Group End Time: 1030  Group Topic: Psychoeducation    Rigo Peterson        Group Therapy Note    Attendees: 6/12       Patient's Goal:    PT will demonstrate increased interpersonal interaction and participate in group activities of discussing anger management.    Notes:  Patient was an active listener during group discussion on this date.     Status After Intervention:  Unchanged    Participation Level: Active Listener and Interactive    Participation Quality: Appropriate, Attentive, and Sharing      Speech:  normal      Thought Process/Content: Logical      Affective Functioning: Flat      Mood: depressed      Level of consciousness:  Alert, Oriented x4, and Attentive      Response to Learning: Able to verbalize/acknowledge new learning and Progressing to goal      Endings: None Reported    Modes of Intervention: Education, Support, and Socialization      Discipline Responsible: /Counselor      Signature:  Rigo Haines

## 2025-02-11 NOTE — PLAN OF CARE
Problem: Self Harm/Suicidality  Goal: Will have no self-injury during hospital stay  Description: INTERVENTIONS:  1.  Ensure constant observer at bedside with Q15M safety checks  2.  Maintain a safe environment  3.  Secure patient belongings  4.  Ensure family/visitors adhere to safety recommendations  5.  Ensure safety tray has been added to patient's diet order  6.  Every shift and PRN: Re-assess suicidal risk via Frequent Screener    2/11/2025 1243 by Raf Jean RN  Outcome: Progressing  Flowsheets (Taken 2/11/2025 1211)  Will have no self-injury during hospital stay:   Ensure constant observer at bedside with Q15M safety checks   Every shift and PRN: Re-assess suicidal risk via Frequent Screener  Note: Patient is free of self harm at this time.  Patient agrees to seek out staff if thoughts to harm self arise.  Staff will provide support and reassurance as needed.  Safety checks maintained every 15 minutes.      Problem: Depression  Goal: Will be euthymic at discharge  Description: INTERVENTIONS:  1. Administer medication as ordered  2. Provide emotional support via 1:1 interaction with staff  3. Encourage involvement in milieu/groups/activities  4. Monitor for social isolation  2/11/2025 1243 by Raf Jean RN  Outcome: Progressing  Note: Denies depression and anxiety      Problem: Pain  Goal: Verbalizes/displays adequate comfort level or baseline comfort level  Outcome: Progressing  Flowsheets (Taken 2/11/2025 1243)  Verbalizes/displays adequate comfort level or baseline comfort level:   Encourage patient to monitor pain and request assistance   Assess pain using appropriate pain scale  Note: Pt medicated with pain medication prn.  Assessed all pain characteristics including level, type, location, frequency, and onset.  Non-pharmacologic interventions offered to pt as well.  Pt states pain is tolerable at this time.           Patient selectively social with peers. Patient thought
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
## 2025-02-12 VITALS
HEART RATE: 60 BPM | OXYGEN SATURATION: 94 % | TEMPERATURE: 97.5 F | HEIGHT: 64 IN | BODY MASS INDEX: 37.56 KG/M2 | RESPIRATION RATE: 16 BRPM | DIASTOLIC BLOOD PRESSURE: 79 MMHG | WEIGHT: 220 LBS | SYSTOLIC BLOOD PRESSURE: 124 MMHG

## 2025-02-12 PROCEDURE — 6370000000 HC RX 637 (ALT 250 FOR IP): Performed by: PSYCHIATRY & NEUROLOGY

## 2025-02-12 PROCEDURE — 6370000000 HC RX 637 (ALT 250 FOR IP): Performed by: INTERNAL MEDICINE

## 2025-02-12 PROCEDURE — 99231 SBSQ HOSP IP/OBS SF/LOW 25: CPT | Performed by: INTERNAL MEDICINE

## 2025-02-12 PROCEDURE — 99239 HOSP IP/OBS DSCHRG MGMT >30: CPT | Performed by: PSYCHIATRY & NEUROLOGY

## 2025-02-12 RX ORDER — BUPRENORPHINE AND NALOXONE 8; 2 MG/1; MG/1
2 FILM, SOLUBLE BUCCAL; SUBLINGUAL DAILY
Qty: 14 FILM | Refills: 0 | Status: SHIPPED | OUTPATIENT
Start: 2025-02-12 | End: 2025-02-19

## 2025-02-12 RX ORDER — SERTRALINE HYDROCHLORIDE 25 MG/1
25 TABLET, FILM COATED ORAL DAILY
Qty: 30 TABLET | Refills: 3 | Status: SHIPPED | OUTPATIENT
Start: 2025-02-13 | End: 2025-02-12

## 2025-02-12 RX ORDER — BUPRENORPHINE AND NALOXONE 8; 2 MG/1; MG/1
2 FILM, SOLUBLE BUCCAL; SUBLINGUAL DAILY
Qty: 14 FILM | Refills: 0 | Status: SHIPPED | OUTPATIENT
Start: 2025-02-12 | End: 2025-02-12

## 2025-02-12 RX ORDER — LEVOTHYROXINE SODIUM 50 UG/1
50 TABLET ORAL DAILY
Qty: 30 TABLET | Refills: 3 | Status: SHIPPED | OUTPATIENT
Start: 2025-02-12

## 2025-02-12 RX ORDER — SERTRALINE HYDROCHLORIDE 25 MG/1
25 TABLET, FILM COATED ORAL DAILY
Qty: 30 TABLET | Refills: 3 | Status: SHIPPED | OUTPATIENT
Start: 2025-02-13

## 2025-02-12 RX ORDER — LEVOTHYROXINE SODIUM 50 UG/1
50 TABLET ORAL DAILY
Qty: 30 TABLET | Refills: 3 | Status: SHIPPED | OUTPATIENT
Start: 2025-02-12 | End: 2025-02-12

## 2025-02-12 RX ORDER — TRAZODONE HYDROCHLORIDE 50 MG/1
50 TABLET ORAL NIGHTLY PRN
Qty: 30 TABLET | Refills: 0 | Status: SHIPPED | OUTPATIENT
Start: 2025-02-12

## 2025-02-12 RX ORDER — TRAZODONE HYDROCHLORIDE 50 MG/1
50 TABLET ORAL NIGHTLY PRN
Qty: 30 TABLET | Refills: 0 | Status: SHIPPED | OUTPATIENT
Start: 2025-02-12 | End: 2025-02-12

## 2025-02-12 RX ORDER — BUDESONIDE AND FORMOTEROL FUMARATE DIHYDRATE 160; 4.5 UG/1; UG/1
2 AEROSOL RESPIRATORY (INHALATION)
Qty: 10.2 G | Refills: 3 | Status: SHIPPED | OUTPATIENT
Start: 2025-02-12

## 2025-02-12 RX ADMIN — LEVOTHYROXINE SODIUM 50 MCG: 0.05 TABLET ORAL at 06:33

## 2025-02-12 RX ADMIN — BUTALBITA,ACETAMINOPHEN AND CAFFEINE 1 CAPSULE: 50; 300; 40 CAPSULE ORAL at 07:37

## 2025-02-12 RX ADMIN — BUPRENORPHINE AND NALOXONE 2 FILM: 4; 1 FILM, SOLUBLE BUCCAL; SUBLINGUAL at 07:38

## 2025-02-12 RX ADMIN — BUTALBITA,ACETAMINOPHEN AND CAFFEINE 1 CAPSULE: 50; 300; 40 CAPSULE ORAL at 13:56

## 2025-02-12 RX ADMIN — BUDESONIDE AND FORMOTEROL FUMARATE DIHYDRATE 2 PUFF: 160; 4.5 AEROSOL RESPIRATORY (INHALATION) at 07:38

## 2025-02-12 ASSESSMENT — PAIN DESCRIPTION - LOCATION
LOCATION: HEAD
LOCATION: HEAD

## 2025-02-12 ASSESSMENT — PAIN SCALES - GENERAL
PAINLEVEL_OUTOF10: 5
PAINLEVEL_OUTOF10: 6

## 2025-02-12 ASSESSMENT — PAIN DESCRIPTION - DESCRIPTORS: DESCRIPTORS: ACHING

## 2025-02-12 ASSESSMENT — PAIN DESCRIPTION - ORIENTATION: ORIENTATION: ANTERIOR

## 2025-02-12 NOTE — DISCHARGE INSTR - DIET
Good nutrition is important when healing from an illness, injury, or surgery.  Follow any nutrition recommendations given to you during your hospital stay.   If you were given an oral nutrition supplement while in the hospital, continue to take this supplement at home.  You can take it with meals, in-between meals, and/or before bedtime. These supplements can be purchased at most local grocery stores, pharmacies, and chain [employer]-stores.   If you have any questions about your diet or nutrition, call the hospital and ask for the dietitian.  Regular diet as tolerated.

## 2025-02-12 NOTE — DISCHARGE INSTR - COC
Continuity of Care Form    Patient Name: Valeria Becerril   :  1995  MRN:  [id number]    Admit date:  2/10/2025  Discharge date:  ***    Code Status Order: Full Code   Advance Directives:   Advance Care Flowsheet Documentation             Admitting Physician:  Scotty Medina MD  PCP: Michelle Chatterjee, [facility] - NP    Discharging Nurse: ***  Discharging Hospital Unit/Room#: 0229/0229-01  Discharging Unit Phone Number: ***    Emergency Contact:   No emergency contact information on file.    Past Surgical History:  Past Surgical History:   Procedure Laterality Date    ABDOMINAL EXPLORATION SURGERY      BRONCHOSCOPY N/A 2023    BRONCHOSCOPY WITH BRONCHOALVEOLAR LAVAGE performed by Sathish Soares MD at [facility]     SECTION      x 2    ENDOSCOPY, COLON, DIAGNOSTIC         Immunization History:     There is no immunization history on file for this patient.    Active Problems:  Patient Active Problem List   Diagnosis Code    Major depressive disorder, recurrent severe without psychotic features ([facility]) F33.2    Opioid dependence ([facility]) F11.20    Endometriosis N80.9    Depression with suicidal ideation F32.A, R45.851    Morbid obesity E66.01    Pneumonia due to infectious organism J18.9    Hypoxia R09.02    Bronchitis J40    Legionella pneumonia ([facility]) A48.1    Persistent cough R05.3       Isolation/Infection:   Isolation            No Isolation          Patient Infection Status       Infection Onset Added Last Indicated Last Indicated By Review Planned Expiration Resolved Resolved By    None active    Resolved    Mycoplasma pneumonia 23 Mycoplasma pneumoniae antibody, IgM   23 Infection                        Nurse Assessment:  Last Vital Signs: /79   Pulse 60   Temp 97.5 °F (36.4 °C) (Temporal)   Resp 16   Ht 1.626 m (5' 4\")   Wt 99.8 kg (220 lb)   LMP 2025 (Approximate)   SpO2 94%   BMI 37.76 kg/m²     Last documented pain score (0-10 scale): Pain

## 2025-02-12 NOTE — GROUP NOTE
Group Therapy Note    Date: 2/12/2025    Group Start Time: 0800  Group End Time: 0810  Group Topic: Orientation Group    Saundra Dunaway        Group Therapy Note    Attendees: 10/13         Patient's Goal:  Review of staff, rules and schedule    Notes:      Status After Intervention:  Unchanged    Participation Level: Active Listener    Participation Quality: Appropriate      Speech:  normal      Thought Process/Content: Logical      Affective Functioning: Congruent      Mood: depressed      Level of consciousness:  Alert      Response to Learning: Able to verbalize current knowledge/experience and Progressing to goal      Endings: None Reported    Modes of Intervention: Education, Support, and Socialization      Discipline Responsible: Behavorial Health Tech      Signature:  Saundra Fernández

## 2025-02-12 NOTE — GROUP NOTE
Group Therapy Note    Date: 2/12/2025    Group Start Time: 0900  Group End Time: 0930  Group Topic: Community Meeting    Saundra Dunaway        Group Therapy Note    Attendees: 4/13     Community Meeting Group Note        Date: February 12, 2025 Start Time: 9am  End Time:  0930      Number of Participants in Group & Unit Census:  4/13    Topic: Goals    Goal of Group: Set short term goal for the day      Comments:     Patient did not participate in Community Meeting group, despite staff encouragement and explanation of benefits.  Patient remain seclusive to self.  Q15 minute safety checks maintained for patient safety and will continue to encourage patient to attend unit programming.

## 2025-02-12 NOTE — GROUP NOTE
Group Therapy Note    Date: 2/12/2025    Group Start Time: 1430  Group End Time: 1500  Group Topic: Recreational    [facility] Saundra Zhao; Viviana Araiza RN        Group Therapy Note    Attendees: 3/10       Patient's Goal:  work on concentration    Notes:      Status After Intervention:  Improved    Participation Level: Active Listener    Participation Quality: Appropriate      Speech:  normal      Thought Process/Content: Logical      Affective Functioning: Congruent      Mood: anxious      Level of consciousness:  Alert      Response to Learning: Able to verbalize current knowledge/experience and Progressing to goal      Endings: None Reported    Modes of Intervention: Education, Support, and Socialization      Discipline Responsible: Behavorial Health Tech      Signature:  Saundra Fernández
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
## 2025-02-12 NOTE — CARE COORDINATION
Discharge Arrangements:  N/a    Guardian notified: n/a    Discharge destination/address: Return home address on facesheet    Transported by:  cab    Patient was not accepting of referral.  Follow up appointment is scheduled for 2/18 at 2PM at [place]    *RUTHY resources were offered to patient throughout admission and at time of discharge. This list of [facility] RUTHY providers was provided to patient:     [facility]  [address] [facility] [address]   [address] [facility] [address]  Phone: [phone]     Phone: [phone]    [facility]   [address] [facility] [address]   [address] [facility] [address]  Phone: [phone]     Phone: [phone]    [facility] [place]  [address] [facility] [address]    [address] [facility] [address] [facility] [address]  Phone: [phone]     Phone: [phone]    [facility]     [facility]   [address] [facility] [facility] [address]  [place] [address]      Phone: [phone]  Phone: [phone]        [facility]  [address] [facility] [facility] [address]   [address] [place] [address]  Phone: [phone]     Phone:  [phone]    [facility] Concepts      [facility], [facility]  [address] [facility] [address]   [address] [facility] [address]  Phone: [phone]     Phone: [phone]    [facility], [facility]  [address] [facility] [address]   [address] [facility] [address]  Phone: [phone]     Phone: [phone]    [facility]  [facility]  [address] [facility] [address] [address] [facility] [address]  Phone: [phone]     Phone: [phone]    [facility] [address] [place] [address] [address] [facility] [address]  Phone:

## 2025-02-12 NOTE — PLAN OF CARE
Problem: Self Harm/Suicidality  Goal: Will have no self-injury during hospital stay  Description: INTERVENTIONS:  1.  Ensure constant observer at bedside with Q15M safety checks  2.  Maintain a safe environment  3.  Secure patient belongings  4.  Ensure family/visitors adhere to safety recommendations  5.  Ensure safety tray has been added to patient's diet order  6.  Every shift and PRN: Re-assess suicidal risk via Frequent Screener    2/11/2025 2234 by Kanika Slater LPN  Outcome: Progressing  Note: Patient denies thoughts to self harm at this time. Patient encouraged to seek out to staff if thoughts arise. Safety checks continued Q15 minutes.     Problem: Depression  Goal: Will be euthymic at discharge  Description: INTERVENTIONS:  1. Administer medication as ordered  2. Provide emotional support via 1:1 interaction with staff  3. Encourage involvement in milieu/groups/activities  4. Monitor for social isolation  2/11/2025 2234 by Kanika Slater LPN  Outcome: Progressing  Note: Patient denies any symptoms of depression at this time, but expresses feeling symptoms of anxiety. Patient encouraged to seek out to staff if symptoms of depression arise.

## 2025-02-12 NOTE — BH NOTE
Patient given quit line phone number [phone] at this time, dangers of longterm tobacco use discussed.  staff will continue to reinforce importance of smoking cessation.

## 2025-02-12 NOTE — PROGRESS NOTES
[facility] Internal Medicine  Devon Reyes MD; Simon Blount MD, Saurav Rios MD, Emilie Cardenas MD, Gardenia Mondragon MD; Ivone Dawson MD    [facility] Internal Medicine   IN-PATIENT SERVICE   [facility]     HISTORY AND PHYSICAL EXAMINATION            Date:   2025  Patient name:  Valeria Becerril  Date of admission:  2/10/2025  6:13 AM  MRN:   [id number]  Account:  [id number]  YOB: 1995  PCP:    Michelle Chatterjee APRN - NP  Room:   [address] [place] [address]  Code Status:    Full Code      Chief Complaint:     Mental health disorder    History Obtained From:     Patient/EMR/bedside RN     History of Present Illness:     29-year-old  lady with history of endometriosis history of GERD history of migraine mitral valve prolapse history of mental disorder depression admitted for same denies any fever chills nausea vomiting no significant weight loss or weight gain recently no history of hypo or hypothyroidism no history of HIV tuberculous  Class II obesity BMI 37.8    Past Medical History:     Past Medical History:   Diagnosis Date    BBB (bundle branch block)     Endometriosis     GERD (gastroesophageal reflux disease)     History of miscarriage     Migraine     MVP (mitral valve prolapse)         Past Surgical History:     Past Surgical History:   Procedure Laterality Date    ABDOMINAL EXPLORATION SURGERY      BRONCHOSCOPY N/A 2023    BRONCHOSCOPY WITH BRONCHOALVEOLAR LAVAGE performed by Sathish Soares MD at [facility]     SECTION      x 2    ENDOSCOPY, COLON, DIAGNOSTIC          Medications Prior to Admission:     Prior to Admission medications    Medication Sig Start Date End Date Taking? Authorizing Provider   budesonide-formoterol (SYMBICORT) 160-4.5 MCG/ACT AERO Inhale 2 puffs into the lungs in the morning and 2 puffs in the evening. 25  Yes Scotty Medina MD   buprenorphine-naloxone (SUBOXONE) 8-2 MG FILM SL film  Place 2 Film under the tongue daily for 7 days. Max Daily Amount: 2 Film 2/12/25 2/19/25 Yes Scotty Medina MD   levothyroxine (SYNTHROID) 50 MCG tablet Take 1 tablet by mouth Daily 2/12/25  Yes Scotty Medina MD   sertraline (ZOLOFT) 25 MG tablet Take 1 tablet by mouth daily 2/13/25  Yes Scotty Medina MD   traZODone (DESYREL) 50 MG tablet Take 1 tablet by mouth nightly as needed for Sleep 2/12/25  Yes Scotty Medina MD        Allergies:     Bee venom, Ziprasidone hcl, Depakote er [divalproex sodium er], Haloperidol, Toradol [ketorolac tromethamine], Azithromycin, and Morphine and codeine    Social History:     Tobacco:    reports that she has quit smoking. Her smoking use included cigarettes. She has never used smokeless tobacco.  Alcohol:      reports that she does not currently use alcohol.  Drug Use:  reports that she does not currently use drugs after having used the following drugs: Other-see comments.    Family History:     History reviewed. No pertinent family history.    Review of Systems:     Positive and Negative as described in HPI.    CONSTITUTIONAL:  negative for fevers, chills, sweats, fatigue, weight loss  HEENT:  negative for vision, hearing changes, runny nose, throat pain  RESPIRATORY:  negative for shortness of breath, cough, congestion, wheezing.  CARDIOVASCULAR:  negative for chest pain, palpitations.  GASTROINTESTINAL:  negative for nausea, vomiting, diarrhea, constipation, change in bowel habits, abdominal pain   GENITOURINARY:  negative for difficulty of urination, burning with urination, frequency   INTEGUMENT:  negative for rash, skin lesions, easy bruising   HEMATOLOGIC/LYMPHATIC:  negative for swelling/edema   ALLERGIC/IMMUNOLOGIC:  negative for urticaria , itching  ENDOCRINE:  negative increase in drinking, increase in urination, hot or cold intolerance  MUSCULOSKELETAL:  negative joint pains, muscle aches, swelling of joints  NEUROLOGICAL:  negative for headaches, dizziness,

## 2025-02-12 NOTE — GROUP NOTE
Group Therapy Note    Date: 2/12/2025    Group Start Time: 1100  Group End Time: 1140  Group Topic: Cognitive Skills    Anita Palomino CTRS    Cognitive Skills Group Note        Date: February 12, 2025 Start Time: 11am  End Time:  1140 am      Number of Participants in Group & Unit Census:  4/13    Topic: cognitive skills     Goal of Group: pt will demonstrate improved coping skills and improved interpersonal relationships       Comments:     Patient did not participate in Cognitive Skills group, despite staff encouragement and explanation of benefits.  Patient remain seclusive to self.  Q15 minute safety checks maintained for patient safety and will continue to encourage patient to attend unit programming.              Signature:  RALF ABBOTT

## 2025-02-12 NOTE — PLAN OF CARE
Problem: Depression  Goal: Will be euthymic at discharge  Description: INTERVENTIONS:  1. Administer medication as ordered  2. Provide emotional support via 1:1 interaction with staff  3. Encourage involvement in milieu/groups/activities  4. Monitor for social isolation  2/12/2025 0925 by Cornelio Salcedo LPN  Outcome: Not Progressing       Problem: Pain  Goal: Verbalizes/displays adequate comfort level or baseline comfort level  Outcome: Not Progressing     Problem: Self Harm/Suicidality  Goal: Will have no self-injury during hospital stay  Description: INTERVENTIONS:  1.  Ensure constant observer at bedside with Q15M safety checks  2.  Maintain a safe environment  3.  Secure patient belongings  4.  Ensure family/visitors adhere to safety recommendations  5.  Ensure safety tray has been added to patient's diet order  6.  Every shift and PRN: Re-assess suicidal risk via Frequent Screener    2/12/2025 0925 by Cornelio Salcedo LPN  Outcome: Progressing  Flowsheets  Taken 2/12/2025 0924  Will have no self-injury during hospital stay:   Ensure constant observer at bedside with Q15M safety checks   Maintain a safe environment   Secure patient belongings  Taken 2/12/2025 0921  Will have no self-injury during hospital stay:   Maintain a safe environment   Ensure constant observer at bedside with Q15M safety checks     Problem: Risk for Elopement  Goal: Patient will not exit the unit/facility without proper excort  2/12/2025 0925 by Cornelio Salcedo LPN  Outcome: Progressing   Patient safe on unit free from self harm or harm to others, Patient is not a risk for elopement at this time. 15 min safety checks in place to ensure safety. Patient expressed headache this morning. Still showing signs of depression with flat affect and selectively social ness with peers. Patient denies any visual/auditory hallucinations. Will continue to monitor and assess.

## 2025-02-12 NOTE — PROGRESS NOTES
CLINICAL PHARMACY NOTE: MEDS TO BEDS    Total # of Prescriptions Filled: 4   The following medications were delivered to the patient:  Symbicort 160/4.5 inhaler  Buprenorphine/Naloxone 8 films(Generic Suboxone)  Sertraline 25mg  Trazodone 50mg    Additional Documentation: 2/12/25 1:02pm kbalfredo delivered to [facility]    -Synthroid too soon to fill on file at [facility] [phone] [address]    11:38am [place] Medicaid still down

## 2025-02-12 NOTE — BH NOTE
[facility]  Discharge Note    Pt discharged with followings belongings:   Dental Appliances: None  Vision - Corrective Lenses: None  Hearing Aid: None  Jewelry: Body Piercing, Ring, Necklace, Earrings, Bracelet  Body Piercings Removed: Yes  Clothing: Jacket/Coat, Pajamas, Pants, Undergarments, Socks, Slippers, Shirt, Belt (chain belt)  Other Valuables: Money, Wallet, Credit/Debit Card, Personal Toiletries, Other (Comment) (make up)   Valuables sent home with patient. Patient educated on aftercare instructions: Yes.  Information faxed to [facility] [phone] by staff  at 3:15 PM .Patient verbalize understanding of AVS:  Yes.    Status EXAM upon discharge:  Mental Status and Behavioral Exam  Normal: No  Level of Assistance: Independent/Self  Facial Expression: Brightened  Affect: Blunt  Level of Consciousness: Alert  Frequency of Checks: 4 times per hour, close  Mood:Normal: No  Mood: Depressed, Anxious  Motor Activity:Normal: Yes  Eye Contact: Good  Observed Behavior: Preoccupied, Guarded, Cooperative  Sexual Misconduct History: Current - no  Preception: [place] to person, [place] to time, [place] to place, [place] to situation  Attention:Normal: No  Attention: Unable to concentrate  Thought Processes: Blocking  Thought Content:Normal: No  Thought Content: Preoccupations  Depression Symptoms: Feelings of hopelessess, Loss of interest, Feelings of helplessness  Anxiety Symptoms: Generalized  Sharlene Symptoms: No problems reported or observed.  Hallucinations: None  Delusions: No  Memory:Normal: No  Memory: Poor recent, Poor remote  Insight and Judgment: No  Insight and Judgment: Poor judgment, Poor insight, Unmotivated    Tobacco Screening:  Practical Counseling, on admission, simin X, if applicable and completed (first 3 are required if patient doesn't refuse):            ( ) Recognizing danger situations (included triggers and roadblocks)                    ( ) Coping skills (new ways to

## 2025-02-12 NOTE — GROUP NOTE
Group Therapy Note    Date: 2/12/2025    Group Start Time: 1330  Group End Time: 1400  Group Topic: recreation group     [facility]    Anita Fine CTRS        Group Therapy Note    Attendees 3/12       Patient's Goal:  pt will demonstrate improved leisure awareness and improved interpersonal relationships    Notes:   pt was pleasant and participated well    Status After Intervention:  Improved    Participation Level: Active Listener and Interactive    Participation Quality: Appropriate, Sharing, and Supportive      Speech:  normal      Thought Process/Content: Logical      Affective Functioning: Congruent      Mood: euthymic      Level of consciousness:  Alert      Response to Learning: Able to verbalize current knowledge/experience, Capable of insight, and Progressing to goal      Endings: None Reported    Modes of Intervention: Education, Support, and Activity      Discipline Responsible: Psychoeducational Specialist      Signature:  RALF ABBOTT

## 2025-02-12 NOTE — DISCHARGE INSTRUCTIONS
Information:  Medications:   Medication summary provided   I understand that I should take only the medications on my list.     -why and when I need to take each medicine.     -which side effects to watch for.     -that I should carry my medication information at all times in case of     Emergency situations.    I will take all of my medicines to follow up appointments.     -check with my physician or pharmacist before taking any new    Medication, over the counter product or drink alcohol.    -Ask about food, drug or dietary supplement interactions.    -discard old lists and update records with medication providers.    Notify Physician:  Notify physician if you notice:   Always call 911 if you feel your life is in danger  In case of an emergency call 911 immediately!  If 911 is not available call your local emergency medical system for help    Behavioral Health Follow Up:  Original Referral Source:[facility]  Discharge Diagnosis: Depression with suicidal ideation [F32.A, R45.851]  Recommendations for Level of Care: take medications as prescribed attend all follow-up appointments.  Patient status at discharge: stable  My hospital  was: [facility]   Aftercare plan faxed: Yes   -faxed by: staff   -date: 02/12/2025   -time: 2PM   Prescriptions: Meds to bed    Smoking: Quit Smoking.   Call the [employer]'s smoking quitline at 2-345-47M-QUIT  Know the signs of a heart attack   If you have any of the following symptoms call 911 immediately, do not wait more    Than five minutes.    1. Pressure, fullness and/ or squeezing in the center of the chest spreading to    The jaw, neck or shoulder.    2. Chest discomfort with light headedness, fainting, sweating, nausea or    Shortness of breath.   3. Upper abdominal pressure or discomfort.   4. Lower chest pain, back pain, unusual fatigue, shortness of breath, nausea   Or dizziness.     General Information:   Questions regarding your bill: Call HELP program [phone]  [phone]     Suicide Hotline ([facility] Crisis Care Line)  (705) 356-3959       To obtain results of pending studies call Medical Records at: [phone]     For emergencies and 24 hour/7 days a week contact information:  [phone]    Substance Use Treatment options provided by [employer] - a website/michelle sponsored by the Mental Health & Recovery Services Board of [facility]. For more information please visit [email or web address]    *RUTHY resources were offered to patient throughout admission and at time of discharge. This list of [facility] RUTHY providers was provided to patient:     [facility]  [address] [facility] [address]    [address] [facility] [address]  Phone: [phone]      Phone: [phone]    [facility] [place] [facility]   [address] [facility] [address]    [address] [facility] [address]  Phone: [phone]      Phone: [phone]    [facility] [place], [facility]  [address] [facility] [address]     [address] [facility] [facility] [address]  Phone: [phone]      Phone: [phone]    [facility]   [address] [facility] [address] [facility] [address]  [address] [facility] [address]  Phone: [phone]      Phone: [phone]    [facility]  [address] [facility] [facility] [address]    [address] [facility] [place] [address]  Phone: [phone]      Phone:  [phone]    New Concepts       [facility], [facility]  [address] [facility] [address]    [address] [facility] [address]  Phone: [phone]      Phone: [phone]    [facility]  [address] [facility] [address]    [address] [facility] [address]  Phone: [phone]      Phone: [phone]    [facility]   [facility]  [address] [facility] [address]  [address]

## 2025-02-12 NOTE — TRANSITION OF CARE
Behavioral Health Transition Record    Patient Name: Valeria Becerril  YOB: 1995   Medical Record Number: [id number]  Date of Admission: 2/10/2025  6:13 AM   Date of Discharge: 02/12/2025    Attending Provider: Scotty Medina MD   Discharging Provider: Scotty Medina MD  To contact this individual call [phone] and ask the  to page.  If unavailable, ask to be transferred to Behavioral Health Provider on call.  A Behavioral Health Provider will be available on call 24/7 and during holidays.    Primary Care Provider: Michelle Chatterjee APRN - NP    Allergies   Allergen Reactions    Bee Venom Anaphylaxis    Ziprasidone Hcl Dizziness or Vertigo    Depakote Er [Divalproex Sodium Er]     Haloperidol     Toradol [Ketorolac Tromethamine]     Azithromycin Rash    Morphine And Codeine Rash       Reason for Admission: Reason for Admission to Psychiatric Unit:  Threat to self requiring 24 hour professional observation  A mental disorder causing major disability in social, interpersonal, occupational, and/or educational functioning that is leading to dangerous or life-threatening functioning, and that can only be addressed in an acute inpatient setting   Failure of outpatient psychiatry treatment so that the beneficiary requires 24 hour professional observation and care  Concerns about patient's safety in the community  Past Mental Health Diagnosis: a history of  Major Depression, Anxiety Disorder, Personality Disorder, Prior suicide attempt, and Alcohol and/or Drug Use Disorder  Triggering event or precipitating factor: Housing instability, Financial instability, Alcohol/Drug Relapse, Relationship Issues, and Psych Treatment Noncompliance  Length of time/duration of triggering event: Weeks  Legal Status: Involuntary    Admission Diagnosis: Depression with suicidal ideation [F32.A, R45.851]    * No surgery found *    No results found for this visit on 02/10/25.    Immunizations administered during this  encounter:   There is no immunization history on file for this patient.  Influenza Vaccination Status: None of the above/Not documented/Unable to determine from medical record documentation    Screening for Metabolic Disorders for Patients on Antipsychotic Medications  (Data obtained from the EMR)    Estimated Body Mass Index  Body mass index is 37.76 kg/m².      Vital Signs/Blood Pressure  /79   Pulse 60   Temp 97.5 °F (36.4 °C) (Temporal)   Resp 16   Ht 1.626 m (5' 4\")   Wt 99.8 kg (220 lb)   LMP 02/05/2025 (Approximate)   SpO2 94%   BMI 37.76 kg/m²      Fasting Blood Glucose or Hemoglobin A1c  No results found for: \"GLU\", \"GLUCPOC\"    No results found for: \"LABA1C\", \"[id number]\"    Discharge Diagnosis: Major depressive disorder, recurrent severe without psychotic features (HCC)     Discharge Plan/Destination: Home    Discharge Medication List and Instructions:      Medication List        START taking these medications      sertraline 25 MG tablet  Commonly known as: ZOLOFT  Take 1 tablet by mouth daily  Start taking on: February 13, 2025  Notes to patient: Mood stabilizer     traZODone 50 MG tablet  Commonly known as: DESYREL  Take 1 tablet by mouth nightly as needed for Sleep  Notes to patient: For sleep            CHANGE how you take these medications      buprenorphine-naloxone 8-2 MG Film SL film  Commonly known as: SUBOXONE  Place 2 Film under the tongue daily for 7 days. Max Daily Amount: 2 Film  What changed: how much to take  Notes to patient: Opioid dependence maintenance             CONTINUE taking these medications      budesonide-formoterol 160-4.5 MCG/ACT Aero  Commonly known as: SYMBICORT  Inhale 2 puffs into the lungs in the morning and 2 puffs in the evening.  Notes to patient: Bronchitis     levothyroxine 50 MCG tablet  Commonly known as: SYNTHROID  Take 1 tablet by mouth Daily  Notes to patient: Thyroid            STOP taking these medications      famotidine 20 MG tablet  Commonly

## 2025-02-13 DIAGNOSIS — F33.9 RECURRENT MAJOR DEPRESSIVE DISORDER, REMISSION STATUS UNSPECIFIED: Primary | ICD-10-CM

## 2025-02-13 RX ORDER — ONDANSETRON 2 MG/ML
8 INJECTION INTRAMUSCULAR; INTRAVENOUS
OUTPATIENT
Start: 2025-02-13

## 2025-02-13 RX ORDER — EPINEPHRINE 1 MG/ML
0.3 INJECTION, SOLUTION, CONCENTRATE INTRAVENOUS PRN
OUTPATIENT
Start: 2025-02-13

## 2025-02-13 RX ORDER — DIPHENHYDRAMINE HYDROCHLORIDE 50 MG/ML
50 INJECTION INTRAMUSCULAR; INTRAVENOUS
OUTPATIENT
Start: 2025-02-13

## 2025-02-13 RX ORDER — ALBUTEROL SULFATE 90 UG/1
4 INHALANT RESPIRATORY (INHALATION) PRN
OUTPATIENT
Start: 2025-02-13

## 2025-02-13 RX ORDER — ACETAMINOPHEN 325 MG/1
650 TABLET ORAL
OUTPATIENT
Start: 2025-02-13

## 2025-02-13 RX ORDER — ONDANSETRON 4 MG/1
4 TABLET, ORALLY DISINTEGRATING ORAL
OUTPATIENT
Start: 2025-02-13

## 2025-02-13 RX ORDER — HYDROCORTISONE SODIUM SUCCINATE 100 MG/2ML
100 INJECTION INTRAMUSCULAR; INTRAVENOUS
OUTPATIENT
Start: 2025-02-13

## 2025-02-13 RX ORDER — SODIUM CHLORIDE 9 MG/ML
INJECTION, SOLUTION INTRAVENOUS CONTINUOUS
OUTPATIENT
Start: 2025-02-13

## 2025-02-13 NOTE — CARE COORDINATION
Name: Valeria Becerril    : 1995    Auth number: [id number]     Discharge Date: 25    Destination: home     Discharge Medications:      Medication List        START taking these medications      sertraline 25 MG tablet  Commonly known as: ZOLOFT  Take 1 tablet by mouth daily  Notes to patient: Mood stabilizer     traZODone 50 MG tablet  Commonly known as: DESYREL  Take 1 tablet by mouth nightly as needed for Sleep  Notes to patient: For sleep            CHANGE how you take these medications      buprenorphine-naloxone 8-2 MG Film SL film  Commonly known as: SUBOXONE  Place 2 Film under the tongue daily for 7 days. Max Daily Amount: 2 Film  What changed: how much to take  Notes to patient: Opioid dependence maintenance             CONTINUE taking these medications      budesonide-formoterol 160-4.5 MCG/ACT Aero  Commonly known as: SYMBICORT  Inhale 2 puffs into the lungs in the morning and 2 puffs in the evening.  Notes to patient: Bronchitis     levothyroxine 50 MCG tablet  Commonly known as: SYNTHROID  Take 1 tablet by mouth Daily  Notes to patient: Thyroid            STOP taking these medications      famotidine 20 MG tablet  Commonly known as: Pepcid               Where to Get Your Medications        These medications were sent to [id number] - [address] -  [phone] -  [phone]  [address] [place] [address]      Phone: [phone]   budesonide-formoterol 160-4.5 MCG/ACT Aero  buprenorphine-naloxone 8-2 MG Film SL film  levothyroxine 50 MCG tablet  sertraline 25 MG tablet  traZODone 50 MG tablet     Pharmacy Instructions:    Meds to bed- [place]           Follow Up Appointment: [facility]  [address]  [facility] [id number] [phone]  Call on 2025  At 10AM- Financial intake via PHONE    [facility] [address]  [facility]

## (undated) DEVICE — TUBING, SUCTION, 3/16" X 10', STRAIGHT: Brand: MEDLINE

## (undated) DEVICE — ST CHARLES BRONCHOSCOPY PACK: Brand: MEDLINE INDUSTRIES, INC.

## (undated) DEVICE — JELLY,LUBE,STERILE,FLIP TOP,TUBE,2-OZ: Brand: MEDLINE

## (undated) DEVICE — YANKAUER,BULB TIP,W/O VENT,RIGID,STERILE: Brand: MEDLINE

## (undated) DEVICE — BITEBLOCK 54FR W/ DENT RIM BLOX

## (undated) DEVICE — SYRINGE MED 50ML LUERSLIP TIP

## (undated) DEVICE — GLOVE ORANGE PI 7   MSG9070